# Patient Record
Sex: FEMALE | Race: OTHER | NOT HISPANIC OR LATINO | Employment: UNEMPLOYED | URBAN - METROPOLITAN AREA
[De-identification: names, ages, dates, MRNs, and addresses within clinical notes are randomized per-mention and may not be internally consistent; named-entity substitution may affect disease eponyms.]

---

## 2023-07-28 ENCOUNTER — OFFICE VISIT (OUTPATIENT)
Dept: URGENT CARE | Facility: CLINIC | Age: 38
End: 2023-07-28
Payer: OTHER GOVERNMENT

## 2023-07-28 VITALS
DIASTOLIC BLOOD PRESSURE: 57 MMHG | BODY MASS INDEX: 22.07 KG/M2 | HEIGHT: 69 IN | TEMPERATURE: 98 F | SYSTOLIC BLOOD PRESSURE: 98 MMHG | OXYGEN SATURATION: 99 % | HEART RATE: 86 BPM | RESPIRATION RATE: 20 BRPM | WEIGHT: 149 LBS

## 2023-07-28 DIAGNOSIS — R35.0 FREQUENT URINATION: ICD-10-CM

## 2023-07-28 DIAGNOSIS — N39.0 URINARY TRACT INFECTION WITHOUT HEMATURIA, SITE UNSPECIFIED: Primary | ICD-10-CM

## 2023-07-28 DIAGNOSIS — R73.09 ELEVATED GLUCOSE: ICD-10-CM

## 2023-07-28 LAB
BILIRUB UR QL STRIP: POSITIVE
GLUCOSE SERPL-MCNC: 153 MG/DL (ref 70–110)
GLUCOSE UR QL STRIP: POSITIVE
KETONES UR QL STRIP: POSITIVE
LEUKOCYTE ESTERASE UR QL STRIP: POSITIVE
PH, POC UA: 6.5
POC BLOOD, URINE: NEGATIVE
POC NITRATES, URINE: POSITIVE
PROT UR QL STRIP: POSITIVE
SP GR UR STRIP: 1.01 (ref 1–1.03)
UROBILINOGEN UR STRIP-ACNC: ABNORMAL (ref 0.1–1.1)

## 2023-07-28 PROCEDURE — 81003 POCT URINALYSIS, DIPSTICK, AUTOMATED, W/O SCOPE: ICD-10-PCS | Mod: QW,S$GLB,, | Performed by: PHYSICIAN ASSISTANT

## 2023-07-28 PROCEDURE — 82962 GLUCOSE BLOOD TEST: CPT | Mod: ,,, | Performed by: PHYSICIAN ASSISTANT

## 2023-07-28 PROCEDURE — 82962 POCT GLUCOSE, HAND-HELD DEVICE: ICD-10-PCS | Mod: ,,, | Performed by: PHYSICIAN ASSISTANT

## 2023-07-28 PROCEDURE — 99204 PR OFFICE/OUTPT VISIT, NEW, LEVL IV, 45-59 MIN: ICD-10-PCS | Mod: S$GLB,,, | Performed by: PHYSICIAN ASSISTANT

## 2023-07-28 PROCEDURE — 99204 OFFICE O/P NEW MOD 45 MIN: CPT | Mod: S$GLB,,, | Performed by: PHYSICIAN ASSISTANT

## 2023-07-28 PROCEDURE — 81003 URINALYSIS AUTO W/O SCOPE: CPT | Mod: QW,S$GLB,, | Performed by: PHYSICIAN ASSISTANT

## 2023-07-28 RX ORDER — LEVOTHYROXINE SODIUM 150 MCG
TABLET ORAL
COMMUNITY
Start: 2023-07-13 | End: 2023-09-21 | Stop reason: SDUPTHER

## 2023-07-28 RX ORDER — MONTELUKAST SODIUM 10 MG/1
1 TABLET ORAL DAILY
COMMUNITY
Start: 2022-09-08

## 2023-07-28 RX ORDER — NITROFURANTOIN 25; 75 MG/1; MG/1
100 CAPSULE ORAL 2 TIMES DAILY
Qty: 10 CAPSULE | Refills: 0 | Status: SHIPPED | OUTPATIENT
Start: 2023-07-28 | End: 2023-08-02

## 2023-07-28 NOTE — PROGRESS NOTES
"Subjective:      Patient ID: Halley Mariee is a 37 y.o. female.    Vitals:  height is 5' 9" (1.753 m) and weight is 67.6 kg (149 lb). Her temperature is 97.5 °F (36.4 °C). Her blood pressure is 98/57 (abnormal) and her pulse is 86. Her respiration is 20 and oxygen saturation is 99%.     Chief Complaint: Urinary Frequency    Pt states" c/o burning when urination, frequent urination, bladder pressure that has been going on for 2 days. Took azo."     ROS   Objective:     Physical Exam    Assessment:     1. Frequent urination        Plan:       Frequent urination  -     POCT Urinalysis, Dipstick, Automated, W/O Scope  -     NuSwab Vaginitis Plus (VG+)                    "

## 2023-07-28 NOTE — PATIENT INSTRUCTIONS
Drink plenty of fluids. Take antibiotics as prescribed. Follow up with your OB. Your glucose is mildly elevated. Make sure you discuss this with your OB. Return for any new or worsening symptoms.

## 2023-07-28 NOTE — PROGRESS NOTES
"Subjective:      Patient ID: aHlley Mariee is a 37 y.o. female.    Vitals:  height is 5' 9" (1.753 m) and weight is 67.6 kg (149 lb). Her temperature is 97.5 °F (36.4 °C). Her blood pressure is 98/57 (abnormal) and her pulse is 86. Her respiration is 20 and oxygen saturation is 99%.     Chief Complaint: Urinary Frequency    Patient is a 37 year old female who presents with dysuria, frequency and urgency for two days. She denied PMH. She reports symptoms started two days ago. Currently taking AZO. Denied fever. Denied flank pain. Denied vaginal discharge or vaginal bleeding. Reports no complications with current pregnancy.       Constitution: Negative for chills and fever.   HENT:  Negative for sore throat.    Respiratory:  Negative for cough.    Gastrointestinal:  Negative for abdominal pain, nausea, vomiting and diarrhea.   Genitourinary:  Positive for dysuria, frequency and urgency. Negative for hematuria, vaginal discharge and vaginal bleeding.    Objective:     Physical Exam   Constitutional: She is oriented to person, place, and time. She appears well-developed.   HENT:   Head: Normocephalic and atraumatic.   Ears:   Right Ear: External ear normal.   Left Ear: External ear normal.   Nose: Nose normal.   Mouth/Throat: Mucous membranes are normal.   Eyes: Conjunctivae and lids are normal.   Neck: Trachea normal. Neck supple.   Cardiovascular: Normal rate, regular rhythm and normal heart sounds.   Pulmonary/Chest: Effort normal and breath sounds normal. No respiratory distress.   Abdominal: Normal appearance.      Comments: Gravid abdomen   Musculoskeletal: Normal range of motion.         General: Normal range of motion.   Neurological: She is alert and oriented to person, place, and time. She has normal strength.   Skin: Skin is warm, dry, intact, not diaphoretic and not pale.   Psychiatric: Her speech is normal and behavior is normal. Judgment and thought content normal.   Nursing note and vitals " reviewed.    Assessment:     1. Urinary tract infection without hematuria, site unspecified    2. Frequent urination    3. Elevated glucose        Plan:       Urinary tract infection without hematuria, site unspecified  -     nitrofurantoin, macrocrystal-monohydrate, (MACROBID) 100 MG capsule; Take 1 capsule (100 mg total) by mouth 2 (two) times daily. for 5 days  Dispense: 10 capsule; Refill: 0  -     CULTURE, URINE    Frequent urination  -     POCT Urinalysis, Dipstick, Automated, W/O Scope  -     NuSwab Vaginitis Plus (VG+)  -     POCT Glucose, Hand-Held Device  -     CULTURE, URINE    Elevated glucose          Medical Decision Making:   History:   Old Medical Records: I decided to obtain old medical records.  Clinical Tests:   Lab Tests: Ordered and Reviewed  Urgent Care Management:  Urgent evaluation of a well-appearing 37-year-old female who presents with dysuria, frequency and urgency.  She denies fever or back pain.  She denies vaginal discharge or abnormal vaginal bleeding.  Vital signs are stable.  She is a gravid abdomen.  Urinalysis consistent with urinary tract infection.  Patient has had to have glucose in the urine.  She is currently taking ACS.  Random glucose was 153.  Patient will be placed on antibiotics instructed to follow up closely with OB.  No sign of systemic illness.  No sign of pyelonephritis.

## 2023-08-02 ENCOUNTER — OFFICE VISIT (OUTPATIENT)
Dept: FAMILY MEDICINE | Facility: CLINIC | Age: 38
End: 2023-08-02
Payer: OTHER GOVERNMENT

## 2023-08-02 VITALS
DIASTOLIC BLOOD PRESSURE: 68 MMHG | SYSTOLIC BLOOD PRESSURE: 107 MMHG | WEIGHT: 149.63 LBS | HEIGHT: 69 IN | OXYGEN SATURATION: 99 % | BODY MASS INDEX: 22.16 KG/M2 | HEART RATE: 79 BPM

## 2023-08-02 DIAGNOSIS — E03.9 ACQUIRED HYPOTHYROIDISM: ICD-10-CM

## 2023-08-02 DIAGNOSIS — J45.42 MODERATE PERSISTENT ASTHMA WITH STATUS ASTHMATICUS: ICD-10-CM

## 2023-08-02 DIAGNOSIS — Z34.90 PREGNANCY, UNSPECIFIED GESTATIONAL AGE: Primary | ICD-10-CM

## 2023-08-02 PROCEDURE — 99203 PR OFFICE/OUTPT VISIT, NEW, LEVL III, 30-44 MIN: ICD-10-PCS | Mod: S$PBB,,, | Performed by: NURSE PRACTITIONER

## 2023-08-02 PROCEDURE — 99204 OFFICE O/P NEW MOD 45 MIN: CPT | Performed by: NURSE PRACTITIONER

## 2023-08-02 PROCEDURE — 99203 OFFICE O/P NEW LOW 30 MIN: CPT | Mod: S$PBB,,, | Performed by: NURSE PRACTITIONER

## 2023-08-02 RX ORDER — ALBUTEROL SULFATE 90 UG/1
2 AEROSOL, METERED RESPIRATORY (INHALATION) EVERY 6 HOURS PRN
Qty: 18 G | Refills: 5 | Status: SHIPPED | OUTPATIENT
Start: 2023-08-02

## 2023-08-02 RX ORDER — FLUTICASONE PROPIONATE AND SALMETEROL 100; 50 UG/1; UG/1
1 POWDER RESPIRATORY (INHALATION) 2 TIMES DAILY
COMMUNITY
End: 2023-08-02 | Stop reason: SDUPTHER

## 2023-08-02 RX ORDER — SERTRALINE HYDROCHLORIDE 50 MG/1
1 TABLET, FILM COATED ORAL DAILY
COMMUNITY
Start: 2022-09-30 | End: 2024-01-11 | Stop reason: SDUPTHER

## 2023-08-02 RX ORDER — FLUTICASONE PROPIONATE AND SALMETEROL 100; 50 UG/1; UG/1
1 POWDER RESPIRATORY (INHALATION) 2 TIMES DAILY
Qty: 60 EACH | Refills: 5 | Status: SHIPPED | OUTPATIENT
Start: 2023-08-02

## 2023-08-02 RX ORDER — FOLIC ACID 0.4 MG
400 TABLET ORAL
Status: ON HOLD | COMMUNITY
End: 2023-11-13 | Stop reason: HOSPADM

## 2023-08-02 RX ORDER — ALBUTEROL SULFATE 90 UG/1
2 AEROSOL, METERED RESPIRATORY (INHALATION) EVERY 6 HOURS PRN
COMMUNITY
Start: 2022-09-07 | End: 2023-08-02 | Stop reason: SDUPTHER

## 2023-08-02 NOTE — PROGRESS NOTES
Subjective:       Patient ID: Halley Mariee is a 37 y.o. female.    Chief Complaint: Establish Care and Referral (OB)    Patient presents today as a new patient to me her today to establish care and to obtain referrals for pregnancy.  Patient is about 26 weeks gestation and just moved to the area with her family. She is 37 years of age with a history of asthma, depression, and graves disease.   She takes levothyroxine and states her levels were normal. She has an apointment with Dr. Russell and is needing a referral for OB management. She is .  She also has a history of postpartum depression. She has taken zoloft in the past and does well after delivery with medication. She is not taking it currently.   Patient is a normal weight for height with a BMI of 22.09      Review of Systems   Constitutional:  Negative for activity change, appetite change and fever.   HENT:  Negative for congestion, ear discharge, ear pain, sore throat, trouble swallowing and voice change.    Eyes:  Negative for photophobia, pain, discharge and visual disturbance.   Respiratory:  Negative for cough, chest tightness and shortness of breath.    Cardiovascular:  Negative for chest pain and palpitations.   Gastrointestinal:  Negative for abdominal pain, nausea and vomiting.   Endocrine: Negative for cold intolerance and heat intolerance.        Hypothyroidism   Genitourinary:  Negative for difficulty urinating and dysuria.        Pregnancy   Musculoskeletal:  Negative for arthralgias and gait problem.   Skin:  Negative for rash.   Allergic/Immunologic: Negative for immunocompromised state.   Neurological:  Negative for speech difficulty and headaches.   Psychiatric/Behavioral:  Positive for dysphoric mood (history). Negative for confusion, self-injury and suicidal ideas.        Past Medical History:   Diagnosis Date    Asthma     Depression affecting pregnancy, postpartum     Graves disease       Past Surgical History:   Procedure  Laterality Date    LAPAROSCOPIC APPENDECTOMY Right        Family History   Problem Relation Age of Onset    Hypertension Mother     Diabetes Mother     Hypothyroidism Father     Vitiligo Father     Hashimoto's thyroiditis Sister     Asthma Sister     Epilepsy Brother     Asthma Daughter         1 daugther    Hypertension Maternal Grandmother     Alzheimer's disease Maternal Grandmother     Hyperlipidemia Maternal Grandmother     Hypertension Maternal Grandfather         passed away lung cancer    Cirrhosis Paternal Grandmother        Social History     Socioeconomic History    Marital status:     Number of children: 4   Tobacco Use    Smoking status: Never    Smokeless tobacco: Never   Substance and Sexual Activity    Alcohol use: Not Currently    Drug use: Not Currently    Sexual activity: Yes     Partners: Male       Current Outpatient Medications   Medication Sig Dispense Refill    cetirizine 10 mg Cap Take by mouth.      folic acid (FOLVITE) 400 MCG tablet Take 400 mcg by mouth.      montelukast (SINGULAIR) 10 mg tablet Take 1 tablet by mouth once daily.      multivitamin-Ca-iron-minerals 18-0.4 mg Tab Take by mouth.      multivitamin-iron-folic acid Tab Take by mouth.      SYNTHROID 150 mcg tablet Take by mouth.      albuterol (PROVENTIL/VENTOLIN HFA) 90 mcg/actuation inhaler Inhale 2 puffs into the lungs every 6 (six) hours as needed for Wheezing or Shortness of Breath. 18 g 5    CALCIUM-MAGNESIUM-VITAMIN D2 ORAL Take by mouth.      fluticasone-salmeterol diskus inhaler 100-50 mcg Inhale 1 puff into the lungs 2 (two) times daily. Controller 60 each 5    sertraline (ZOLOFT) 50 MG tablet Take 1 tablet by mouth once daily.       No current facility-administered medications for this visit.       Review of patient's allergies indicates:   Allergen Reactions    Bee pollen      Wheezing   Trigger asthma    Chaste tree extract      Wheezing   Trigger asthma    Grass pollen-red top, standard      Wheezing  "  Trigger asthma    Mite extract      Wheezing   Trigger asthma    Mold      Wheezing   Trigger asthma     Objective:    HPI     Referral     Additional comments: OB          Last edited by Marli Marcelino LPN on 8/2/2023  1:42 PM.      Blood pressure 107/68, pulse 79, height 5' 9" (1.753 m), weight 67.9 kg (149 lb 9.6 oz), SpO2 99 %. Body mass index is 22.09 kg/m².   Physical Exam  Vitals and nursing note reviewed.   Constitutional:       General: She is not in acute distress.     Appearance: Normal appearance. She is well-developed.   HENT:      Head: Normocephalic and atraumatic.      Right Ear: External ear normal.      Left Ear: External ear normal.      Nose: Nose normal.      Mouth/Throat:      Mouth: Mucous membranes are moist.      Pharynx: Oropharynx is clear. No oropharyngeal exudate.   Eyes:      General: Lids are normal. Lids are everted, no foreign bodies appreciated.      Conjunctiva/sclera: Conjunctivae normal.      Pupils: Pupils are equal, round, and reactive to light.      Right eye: Pupil is round and reactive.      Left eye: Pupil is round and reactive.   Neck:      Trachea: Trachea normal.   Cardiovascular:      Rate and Rhythm: Normal rate and regular rhythm.      Pulses: Normal pulses.      Heart sounds: Normal heart sounds, S1 normal and S2 normal.   Pulmonary:      Effort: Pulmonary effort is normal.      Breath sounds: Normal breath sounds.   Abdominal:      General: Abdomen is flat. Bowel sounds are normal. There is distension (pregnancy).      Palpations: Abdomen is soft. Abdomen is not rigid.      Tenderness: There is no guarding.   Musculoskeletal:         General: Normal range of motion.      Cervical back: Normal range of motion and neck supple. No muscular tenderness.   Lymphadenopathy:      Cervical: No cervical adenopathy.   Skin:     General: Skin is warm and dry.      Capillary Refill: Capillary refill takes less than 2 seconds.   Neurological:      General: No focal deficit " present.      Mental Status: She is alert and oriented to person, place, and time.   Psychiatric:         Mood and Affect: Mood and affect normal. Mood is not anxious or depressed.         Behavior: Behavior normal. Behavior is cooperative.         Thought Content: Thought content normal.         Judgment: Judgment normal.             Assessment:       1. Pregnancy, unspecified gestational age    2. Moderate persistent asthma with status asthmaticus    3. Acquired hypothyroidism    4. BMI 22.0-22.9, adult        Plan:       Halley was seen today for establish care and referral.    Diagnoses and all orders for this visit:    Pregnancy, unspecified gestational age  -     Ambulatory referral/consult to Obstetrics / Gynecology; Future    Moderate persistent asthma with status asthmaticus  -     fluticasone-salmeterol diskus inhaler 100-50 mcg; Inhale 1 puff into the lungs 2 (two) times daily. Controller  -     albuterol (PROVENTIL/VENTOLIN HFA) 90 mcg/actuation inhaler; Inhale 2 puffs into the lungs every 6 (six) hours as needed for Wheezing or Shortness of Breath.    Acquired hypothyroidism  Continue current dose.  Levels can be checked and managed by OB or can be managed by me. Patient will notify me if she is need of a refill but denies need at this time.    BMI 22.0-22.9, adult  Healthy diet and exercise encouraged.

## 2023-08-04 LAB
A VAGINAE DNA VAG QL NAA+PROBE: NORMAL SCORE
BVAB2 DNA VAG QL NAA+PROBE: NORMAL SCORE
C ALBICANS DNA VAG QL NAA+PROBE: NEGATIVE
C GLABRATA DNA VAG QL NAA+PROBE: NEGATIVE
C TRACH DNA VAG QL NAA+PROBE: NEGATIVE
MEGA1 DNA VAG QL NAA+PROBE: NORMAL SCORE
N GONORRHOEA DNA VAG QL NAA+PROBE: NEGATIVE
T VAGINALIS DNA VAG QL NAA+PROBE: NEGATIVE

## 2023-08-05 LAB
BACTERIA UR CULT: ABNORMAL
BACTERIA UR CULT: ABNORMAL
OTHER ANTIBIOTIC SUSC ISLT: ABNORMAL

## 2023-08-15 LAB
ABO + RH BLD: NORMAL
HIV 1+2 AB+HIV1 P24 AG SERPL QL IA: NEGATIVE
RUBELLA IMMUNE STATUS: NORMAL
STREP B PCR (OHS): NOT DETECTED
T PALLIDUM AB SER QL: NONREACTIVE

## 2023-09-19 ENCOUNTER — PATIENT MESSAGE (OUTPATIENT)
Dept: MATERNAL FETAL MEDICINE | Facility: CLINIC | Age: 38
End: 2023-09-19
Payer: OTHER GOVERNMENT

## 2023-09-19 ENCOUNTER — TELEPHONE (OUTPATIENT)
Dept: MATERNAL FETAL MEDICINE | Facility: CLINIC | Age: 38
End: 2023-09-19
Payer: OTHER GOVERNMENT

## 2023-09-19 DIAGNOSIS — Z36.89 ENCOUNTER FOR FETAL ANATOMIC SURVEY: Primary | ICD-10-CM

## 2023-09-19 NOTE — TELEPHONE ENCOUNTER
Left patient a message to return my call at [phone].    ----- Message from Eugenie Elder sent at 9/19/2023 12:58 PM CDT -----  Regarding: Referral  Dr. TERI Ojeda sent the [facility] team a referral for the following patient to be seen in the Maternal Fetal Medicine Clinic. I have updated the patients demographics and scanned their records into .     Can you please connect with the patient in order to schedule their MFM appointment please?     Thank you,   [facility]

## 2023-09-21 ENCOUNTER — PATIENT MESSAGE (OUTPATIENT)
Dept: FAMILY MEDICINE | Facility: CLINIC | Age: 38
End: 2023-09-21

## 2023-09-21 DIAGNOSIS — E03.9 ACQUIRED HYPOTHYROIDISM: Primary | ICD-10-CM

## 2023-09-21 RX ORDER — LEVOTHYROXINE SODIUM 150 MCG
TABLET ORAL
Qty: 90 TABLET | Refills: 1 | Status: SHIPPED | OUTPATIENT
Start: 2023-09-21 | End: 2024-01-11 | Stop reason: SDUPTHER

## 2023-09-22 ENCOUNTER — TELEPHONE (OUTPATIENT)
Dept: MATERNAL FETAL MEDICINE | Facility: CLINIC | Age: 38
End: 2023-09-22
Payer: OTHER GOVERNMENT

## 2023-09-22 NOTE — TELEPHONE ENCOUNTER
Spoke with patient today and scheduled her for a Maternal Fetal Medicine consult and ultrasound on 10/03 at 1240 PM.  Patient verbalized her understanding.      ----- Message from Tahmina Fritz MA sent at 9/22/2023  9:14 AM CDT -----  Pt is returning your call to schedule consult.      Pt contact: [phone].

## 2023-10-03 ENCOUNTER — PROCEDURE VISIT (OUTPATIENT)
Dept: MATERNAL FETAL MEDICINE | Facility: CLINIC | Age: 38
End: 2023-10-03
Payer: OTHER GOVERNMENT

## 2023-10-03 ENCOUNTER — OFFICE VISIT (OUTPATIENT)
Dept: MATERNAL FETAL MEDICINE | Facility: CLINIC | Age: 38
End: 2023-10-03
Payer: OTHER GOVERNMENT

## 2023-10-03 VITALS — WEIGHT: 162.25 LBS | BODY MASS INDEX: 23.96 KG/M2

## 2023-10-03 DIAGNOSIS — J45.909 ASTHMA AFFECTING PREGNANCY IN THIRD TRIMESTER: ICD-10-CM

## 2023-10-03 DIAGNOSIS — Z36.89 ENCOUNTER FOR FETAL ANATOMIC SURVEY: ICD-10-CM

## 2023-10-03 DIAGNOSIS — O99.513 ASTHMA AFFECTING PREGNANCY IN THIRD TRIMESTER: ICD-10-CM

## 2023-10-03 DIAGNOSIS — O09.523 MULTIGRAVIDA OF ADVANCED MATERNAL AGE IN THIRD TRIMESTER: ICD-10-CM

## 2023-10-03 DIAGNOSIS — E05.00 GRAVES DISEASE: ICD-10-CM

## 2023-10-03 PROBLEM — O99.013 ANEMIA AFFECTING PREGNANCY IN THIRD TRIMESTER: Status: ACTIVE | Noted: 2023-10-03

## 2023-10-03 PROBLEM — O99.013 ANEMIA AFFECTING PREGNANCY IN THIRD TRIMESTER: Status: RESOLVED | Noted: 2023-10-03 | Resolved: 2023-10-03

## 2023-10-03 PROCEDURE — 76816 OB US FOLLOW-UP PER FETUS: CPT | Mod: PBBFAC | Performed by: OBSTETRICS & GYNECOLOGY

## 2023-10-03 PROCEDURE — 99214 OFFICE O/P EST MOD 30 MIN: CPT | Mod: S$PBB,,, | Performed by: OBSTETRICS & GYNECOLOGY

## 2023-10-03 PROCEDURE — 99214 PR OFFICE/OUTPT VISIT, EST, LEVL IV, 30-39 MIN: ICD-10-PCS | Mod: S$PBB,,, | Performed by: OBSTETRICS & GYNECOLOGY

## 2023-10-03 PROCEDURE — 99999 PR PBB SHADOW E&M-EST. PATIENT-LVL III: CPT | Mod: PBBFAC,,, | Performed by: OBSTETRICS & GYNECOLOGY

## 2023-10-03 PROCEDURE — 99999 PR PBB SHADOW E&M-EST. PATIENT-LVL III: ICD-10-PCS | Mod: PBBFAC,,, | Performed by: OBSTETRICS & GYNECOLOGY

## 2023-10-03 PROCEDURE — 76816 US MFM PROCEDURE (VIEWPOINT): ICD-10-PCS | Mod: 26,S$PBB,, | Performed by: OBSTETRICS & GYNECOLOGY

## 2023-10-03 PROCEDURE — 99213 OFFICE O/P EST LOW 20 MIN: CPT | Mod: PBBFAC | Performed by: OBSTETRICS & GYNECOLOGY

## 2023-10-03 NOTE — ASSESSMENT & PLAN NOTE
- S/p BATISTA at 10yo for Graves Disease    Recommendations:  Medication management:   Continue Synthroid at the current dose of 150 micrograms/day with 300mcg on Sundays   Up to 1/3 of women may require increased hormone replacement in the first trimester, thus consider an empiric 25% increase in medication dose at pregnancy confirmation, while discharging postpartum patients on their pre-pregnancy dose.    Laboratory evaluation:   TSH should be monitored at least every trimester, or every 4 weeks after any medication adjustment. Adjust treatment as necessary to maintain TSH goal < 2.5 mIU/L.

## 2023-10-03 NOTE — ASSESSMENT & PLAN NOTE
- taking ASA 81  - cfDNA negative  - AFP negative  - Second trimester anatomy scan at OSH with appropriate growth and no abnormalities noted  - Repeat US performed today with no interval changes. Appropriate growth, no abnormalities.   - Counseled patient regarding the above normal findings and discussed that we will follow up with patient as needed.

## 2023-10-03 NOTE — PROGRESS NOTES
Hematoma  A hematoma is caused by an injury with damage to small blood vessels. This causes blood to leak into the tissues. Blood forms a pocket under the skin that swells and looks like a purplish patch.  Gradually the blood in the hematoma is absorbed back into the body. The swelling and pain of the hematoma will go away. This takes from one to four weeks, depending on the size of the hematoma. The skin over the hematoma may turn bluish then brown and yellow as the blood is dissolved and absorbed.  Home Care:  · Limit motion of the joints near the hematoma. If the hematoma is large and painful, you should avoid sports and other vigorous physical activity until the swelling and pain goes away.  · Apply an ice pack (ice cubes in a plastic bag, wrapped in a towel) over the injured area for 20 minutes every 1-2 hours the first day. You should continue with ice packs 3-4 times a day for the next two days. Continue the use of ice packs for relief of pain and swelling as needed.  · You may use acetaminophen (Tylenol) or ibuprofen (Motrin, Advil) to control pain, unless another pain medicine was prescribed. [ NOTE : If you have chronic liver or kidney disease or ever had a stomach ulcer or GI bleeding, talk with your doctor before using these medicines.]  Follow Up  with your doctor or as advised by our staff.  [ NOTE: A radiologist will review any X-rays that were taken. We will notify you of any new findings that may affect your care.]  Get Prompt Medical Attention  if any of the following occur:  · Redness around the hematoma  · Increase in pain or warmth in the hematoma  · Increase in size of the hematoma  · Fever of 100.4ºF (38ºC) or higher, or as directed by your healthcare provider  · If the hematoma is on the arm or leg, watch for:  ¨ Increased swelling or pain in the extremity  ¨ Numbness or tingling or blue color of the hand or foot  © 9227-8359 [employer]. [address] [place] [address], [facility]  MATERNAL-FETAL MEDICINE   CONSULT NOTE    Provider requesting consultation: Bienvenido Russell MD     SUBJECTIVE:     Ms. Hallye Mariee is a 37 y.o.  female with IUP at 35w0d who is seen in consultation by MFM for evaluation and management of:  Problem   Multigravida of Advanced Maternal Age in Third Trimester   Graves Disease   Asthma Affecting Pregnancy in Third Trimester            Medication List with Changes/Refills   Current Medications    ALBUTEROL (PROVENTIL/VENTOLIN HFA) 90 MCG/ACTUATION INHALER    Inhale 2 puffs into the lungs every 6 (six) hours as needed for Wheezing or Shortness of Breath.    CALCIUM-MAGNESIUM-VITAMIN D2 ORAL    Take by mouth.    CETIRIZINE 10 MG CAP    Take by mouth.    FLUTICASONE-SALMETEROL DISKUS INHALER 100-50 MCG    Inhale 1 puff into the lungs 2 (two) times daily. Controller    FOLIC ACID (FOLVITE) 400 MCG TABLET    Take 400 mcg by mouth.    MONTELUKAST (SINGULAIR) 10 MG TABLET    Take 1 tablet by mouth once daily.    MULTIVITAMIN-CA-IRON-MINERALS 18-0.4 MG TAB    Take by mouth.    MULTIVITAMIN-IRON-FOLIC ACID TAB    Take by mouth.    PRENATAL VIT NO.124/IRON/FOLIC (PRENATAL VITAMIN ORAL)    Take by mouth Daily.    SERTRALINE (ZOLOFT) 50 MG TABLET    Take 1 tablet by mouth once daily.    SYNTHROID 150 MCG TABLET    Brand Only. Take 1 tablets 150 mcg by mouth daily.       Review of patient's allergies indicates:   Allergen Reactions    Bee pollen      Wheezing   Trigger asthma    Chaste tree extract      Wheezing   Trigger asthma    Grass pollen-red top, standard      Wheezing   Trigger asthma    Mite extract      Wheezing   Trigger asthma    Mold      Wheezing   Trigger asthma       PMH:  Past Medical History:   Diagnosis Date    Asthma     Depression affecting pregnancy, postpartum     Graves disease        PObHx:  OB History    Para Term  AB Living   6 3 3   2 3   SAB IAB Ectopic Multiple Live Births   2       3      # Outcome Date [place] Lbr Tarun/2nd Weight Sex  94373. All rights reserved. This information is not intended as a substitute for professional medical care. Always follow your healthcare professional's instructions.        Head Injury (Child)       Your child has a head injury. It does not appear serious at this time. But symptoms of a more serious problem, such as mild brain injury (concussion), or bruising or bleeding in the brain, may appear later. For this reason, you will need to watch your child for any of the symptoms listed below. Once at home, also be sure to follow any care instructions you’re given for your child.  Home care  Watch for the following symptoms  For the next 24 hours (or longer, if directed), you or another adult must stay with your child. Seek emergency medical care if your child has any of these symptoms over the next hours to days:   · Headache  · Nausea or vomiting  · Dizziness  · Sensitivity to light or noise  · Unusual sleepiness or grogginess  · Trouble falling asleep  · Personality changes  · Vision changes  · Memory loss  · Confusion  · Trouble walking or clumsiness  · Loss of consciousness (even for a short time)  · Inability to be awakened  · Stiff neck  · Weakness or numbness in any part of the body  · Seizures  For young children, also watch for crying that can’t be soothed, refusal to feed, or any signs of changes to the head such as bruising, bulging, or a soft or pushed-in spot.  General care  · If your child was prescribed medicines for pain, be sure to given them to your child as directed. Note: Don’t give your child other pain medicines without checking with the provider first.  · To help reduce swelling and pain, apply a cold source to the injured area for up to 20 minutes at a time. Do this as often as directed. Use a cold pack or bag of ice wrapped in a thin towel. Never apply a cold source directly to the skin.  · If your child has cuts or scrapes on the face or scalp, care for them as directed.  · For the next 24  Delivery Anes PTL Lv   6 Current            5 SAB 12/2022     SAB      4 Term 06/16/20 40w0d  3.459 kg (7 lb 10 oz) F Vag-Spont  N YAZAN   3 SAB 01/2019     SAB      2 Term 10/22/14 40w1d  3.289 kg (7 lb 4 oz) F Vag-Spont  N YAZAN   1 Term 07/18/06 40w1d  3.317 kg (7 lb 5 oz) F Vag-Spont  N YAZAN       PSH:  Past Surgical History:   Procedure Laterality Date    LAPAROSCOPIC APPENDECTOMY Right        Family history:family history includes Alzheimer's disease in her maternal grandmother; Asthma in her daughter and sister; Cirrhosis in her paternal grandmother; Diabetes in her mother; Epilepsy in her brother; Hashimoto's thyroiditis in her sister; Hyperlipidemia in her maternal grandmother; Hypertension in her maternal grandfather, maternal grandmother, and mother; Hypothyroidism in her father; Vitiligo in her father.    Social history: reports that she has never smoked. She has never used smokeless tobacco. She reports that she does not currently use alcohol. She reports that she does not currently use drugs.    Genetic history:  The patient denies any inherited genetic diseases or birth defects in herself or her partner's personal history or family.    Objective:   Wt 73.6 kg (162 lb 4.1 oz)   LMP  (LMP Unknown)   BMI 23.96 kg/m²     Physical Exam  Vitals reviewed.   Constitutional:       Appearance: Normal appearance.   HENT:      Head: Normocephalic and atraumatic.   Eyes:      Extraocular Movements: Extraocular movements intact.      Conjunctiva/sclera: Conjunctivae normal.   Cardiovascular:      Rate and Rhythm: Normal rate.   Pulmonary:      Effort: Pulmonary effort is normal. No respiratory distress.   Abdominal:      Comments: Gravid uterus   Neurological:      General: No focal deficit present.      Mental Status: She is alert and oriented to person, place, and time.   Psychiatric:         Mood and Affect: Mood normal.         Behavior: Behavior normal.         Ultrasound performed. See viewpoint for full  hours (or longer, if advised), your child will need to:  ¨ Avoid lifting and other strenuous activities.  ¨ Avoid playing sports or any other activities that could result in another head injury.  ¨ Limit TV, smartphones, video games, computers, and music or avoid them completely. These activities may make symptoms worse.  Follow-up care  Follow up with your child’s healthcare provider, or as directed. If imaging tests were done, they will be reviewed by a doctor. You will be told the results and any new findings that may affect your child’s care.  When to seek medical advice  Unless told otherwise, call the provider right away if:  · Your child is 3 months old or younger and has a fever of 100.4°F (38°C) or higher. (Get medical care right away. Fever in a young baby can be a sign of a dangerous infection.)  · Your child is younger than 2 years of age and has a fever of 100.4°F (38°C) that lasts for more than 1 day.  · Your child is 2 years old or older and has a fever of 100.4°F (38°C) that lasts for more than 3 days.  · Your child is of any age and has repeated fevers above 104°F (40°C).  Also call the provider right away if your child has any of the following:  · Pain that doesn’t get better or worsens  · New or increased swelling or bruising  · Increased redness, warmth, drainage, or bleeding from the injured area  · Fluid drainage or bleeding from the nose or ears  · Sick appearance or behaviors that worry you  © 0318-3851 [employer]. [address] [place] [address], [place] [address]. All rights reserved. This information is not intended as a substitute for professional medical care. Always follow your healthcare professional's instructions.         ultrasound report.      Significant labs/imaging:  N/A    ASSESSMENT/PLAN:     37 y.o.  female with IUP at 35w0d     Multigravida of advanced maternal age in third trimester  - taking ASA 81  - cfDNA negative  - AFP negative  - Second trimester anatomy scan at OSH with appropriate growth and no abnormalities noted  - Repeat US performed today with no interval changes. Appropriate growth, no abnormalities.   - Counseled patient regarding the above normal findings and discussed that we will follow up with patient as needed.     Graves disease  - S/p BATISTA at 10yo for Graves Disease    Recommendations:  Medication management:  Continue Synthroid at the current dose of 150 micrograms/day with 300mcg on Sundays  Up to 1/3 of women may require increased hormone replacement in the first trimester, thus consider an empiric 25% increase in medication dose at pregnancy confirmation, while discharging postpartum patients on their pre-pregnancy dose.    Laboratory evaluation:   TSH should be monitored at least every trimester, or every 4 weeks after any medication adjustment. Adjust treatment as necessary to maintain TSH goal < 2.5 mIU/L.    Asthma affecting pregnancy in third trimester  - Asymptomatic  - Using singulair and advair  - Routine care per primary      FOLLOW UP:   No further ultrasounds or visits were scheduled  F/u with primary OB for routine prenatal care      30 minutes of total time spent on the encounter, which includes face to face time and non-face to face time preparing to see the patient (eg, review of tests), obtaining and/or reviewing separately obtained history, documenting clinical information in the electronic or other health record, independently interpreting results (not separately reported) and communicating results to the patient/family/caregiver, or care coordination (not separately reported).    Andie Richards MD  OB/GYN PGY-2     Electronically Signed by Andie Richards October 3, 2023

## 2023-10-11 DIAGNOSIS — O09.523 ADVANCED MATERNAL AGE IN MULTIGRAVIDA, THIRD TRIMESTER: Primary | ICD-10-CM

## 2023-10-13 ENCOUNTER — HOSPITAL ENCOUNTER (OUTPATIENT)
Facility: HOSPITAL | Age: 38
Discharge: HOME | End: 2023-10-13
Attending: SPECIALIST | Admitting: SPECIALIST
Payer: OTHER GOVERNMENT

## 2023-10-13 VITALS — SYSTOLIC BLOOD PRESSURE: 106 MMHG | DIASTOLIC BLOOD PRESSURE: 58 MMHG | HEART RATE: 81 BPM

## 2023-10-13 DIAGNOSIS — O36.8190 DECREASED FETAL MOVEMENT: ICD-10-CM

## 2023-10-13 PROCEDURE — 99211 OFF/OP EST MAY X REQ PHY/QHP: CPT | Mod: 25

## 2023-10-13 PROCEDURE — 59025 FETAL NON-STRESS TEST: CPT | Mod: 59

## 2023-10-13 RX ORDER — PROCHLORPERAZINE EDISYLATE 5 MG/ML
5 INJECTION INTRAMUSCULAR; INTRAVENOUS EVERY 6 HOURS PRN
Status: DISCONTINUED | OUTPATIENT
Start: 2023-10-13 | End: 2023-10-13 | Stop reason: HOSPADM

## 2023-10-13 RX ORDER — SODIUM CHLORIDE, SODIUM LACTATE, POTASSIUM CHLORIDE, CALCIUM CHLORIDE 600; 310; 30; 20 MG/100ML; MG/100ML; MG/100ML; MG/100ML
INJECTION, SOLUTION INTRAVENOUS CONTINUOUS
Status: DISCONTINUED | OUTPATIENT
Start: 2023-10-13 | End: 2023-10-13 | Stop reason: HOSPADM

## 2023-10-13 NOTE — PROGRESS NOTES
10/13/23 1156   Non Stress Test - General   $ NST Procedure Charge Completed   Indications/Pt Reported Complaint decreased FM   Number of Fetuses 1   Acoustic Stimulator No   Contractions Not present   Nonstress Test Baby A   Variability Moderate   Decels None   Accels Present   Baseline    BRODY (If Indicated) (cm) 14.5 cm   Interpretation Baby A   Nonstress Test Interpretation Reactive   Overall Impression Reassuring   Comments Strip Reviewed. Reactive and reassuring. BPP 8/8 Vertex. Dr. Russell notified. OK to discharge home.

## 2023-10-17 ENCOUNTER — HOSPITAL ENCOUNTER (OUTPATIENT)
Facility: HOSPITAL | Age: 38
Discharge: HOME | End: 2023-10-17
Attending: SPECIALIST | Admitting: SPECIALIST
Payer: OTHER GOVERNMENT

## 2023-10-17 VITALS — RESPIRATION RATE: 17 BRPM | HEART RATE: 85 BPM | SYSTOLIC BLOOD PRESSURE: 101 MMHG | DIASTOLIC BLOOD PRESSURE: 59 MMHG

## 2023-10-17 DIAGNOSIS — O09.523 ADVANCED MATERNAL AGE IN MULTIGRAVIDA, THIRD TRIMESTER: ICD-10-CM

## 2023-10-17 PROCEDURE — 59025 FETAL NON-STRESS TEST: CPT

## 2023-10-20 ENCOUNTER — HOSPITAL ENCOUNTER (OUTPATIENT)
Facility: HOSPITAL | Age: 38
Discharge: HOME | End: 2023-10-20
Attending: SPECIALIST | Admitting: SPECIALIST
Payer: OTHER GOVERNMENT

## 2023-10-20 VITALS — SYSTOLIC BLOOD PRESSURE: 101 MMHG | DIASTOLIC BLOOD PRESSURE: 56 MMHG | HEART RATE: 85 BPM | RESPIRATION RATE: 16 BRPM

## 2023-10-20 DIAGNOSIS — O09.523 ADVANCED MATERNAL AGE IN MULTIGRAVIDA, THIRD TRIMESTER: ICD-10-CM

## 2023-10-20 PROCEDURE — 59025 FETAL NON-STRESS TEST: CPT

## 2023-10-27 ENCOUNTER — PATIENT MESSAGE (OUTPATIENT)
Dept: FAMILY MEDICINE | Facility: CLINIC | Age: 38
End: 2023-10-27

## 2023-10-31 ENCOUNTER — HOSPITAL ENCOUNTER (OUTPATIENT)
Facility: HOSPITAL | Age: 38
Discharge: HOME | End: 2023-10-31
Attending: SPECIALIST | Admitting: SPECIALIST
Payer: OTHER GOVERNMENT

## 2023-10-31 VITALS — DIASTOLIC BLOOD PRESSURE: 57 MMHG | HEART RATE: 80 BPM | SYSTOLIC BLOOD PRESSURE: 98 MMHG

## 2023-10-31 DIAGNOSIS — O09.523 ADVANCED MATERNAL AGE IN MULTIGRAVIDA, THIRD TRIMESTER: ICD-10-CM

## 2023-10-31 PROCEDURE — 59025 FETAL NON-STRESS TEST: CPT

## 2023-11-07 DIAGNOSIS — Z34.90 ENCOUNTER FOR ELECTIVE INDUCTION OF LABOR: Primary | ICD-10-CM

## 2023-11-11 ENCOUNTER — HOSPITAL ENCOUNTER (INPATIENT)
Facility: HOSPITAL | Age: 38
LOS: 2 days | Discharge: HOME | End: 2023-11-13
Attending: SPECIALIST | Admitting: STUDENT IN AN ORGANIZED HEALTH CARE EDUCATION/TRAINING PROGRAM
Payer: OTHER GOVERNMENT

## 2023-11-11 DIAGNOSIS — O09.523 MULTIGRAVIDA OF ADVANCED MATERNAL AGE IN THIRD TRIMESTER: ICD-10-CM

## 2023-11-11 DIAGNOSIS — Z34.90 PREGNANCY: ICD-10-CM

## 2023-11-11 DIAGNOSIS — N85.8 ALTERATION IN COMFORT ASSOCIATED WITH UTERINE CONTRACTIONS: ICD-10-CM

## 2023-11-11 PROCEDURE — 12000002 HC ACUTE/MED SURGE SEMI-PRIVATE ROOM

## 2023-11-11 PROCEDURE — 86592 SYPHILIS TEST NON-TREP QUAL: CPT | Performed by: STUDENT IN AN ORGANIZED HEALTH CARE EDUCATION/TRAINING PROGRAM

## 2023-11-11 PROCEDURE — 86870 RBC ANTIBODY IDENTIFICATION: CPT | Performed by: STUDENT IN AN ORGANIZED HEALTH CARE EDUCATION/TRAINING PROGRAM

## 2023-11-11 PROCEDURE — 63600175 PHARM REV CODE 636 W HCPCS: Performed by: STUDENT IN AN ORGANIZED HEALTH CARE EDUCATION/TRAINING PROGRAM

## 2023-11-11 PROCEDURE — 99211 OFF/OP EST MAY X REQ PHY/QHP: CPT

## 2023-11-11 PROCEDURE — 63600175 PHARM REV CODE 636 W HCPCS: Performed by: ANESTHESIOLOGY

## 2023-11-11 PROCEDURE — 85025 COMPLETE CBC W/AUTO DIFF WBC: CPT | Performed by: STUDENT IN AN ORGANIZED HEALTH CARE EDUCATION/TRAINING PROGRAM

## 2023-11-11 PROCEDURE — 81003 URINALYSIS AUTO W/O SCOPE: CPT | Mod: 59 | Performed by: STUDENT IN AN ORGANIZED HEALTH CARE EDUCATION/TRAINING PROGRAM

## 2023-11-11 PROCEDURE — 80307 DRUG TEST PRSMV CHEM ANLYZR: CPT | Performed by: STUDENT IN AN ORGANIZED HEALTH CARE EDUCATION/TRAINING PROGRAM

## 2023-11-11 PROCEDURE — 80348 DRUG SCREENING BUPRENORPHINE: CPT | Performed by: STUDENT IN AN ORGANIZED HEALTH CARE EDUCATION/TRAINING PROGRAM

## 2023-11-11 PROCEDURE — 85461 HEMOGLOBIN FETAL: CPT | Performed by: SPECIALIST

## 2023-11-11 PROCEDURE — 86900 BLOOD TYPING SEROLOGIC ABO: CPT | Performed by: STUDENT IN AN ORGANIZED HEALTH CARE EDUCATION/TRAINING PROGRAM

## 2023-11-11 RX ORDER — ONDANSETRON 4 MG/1
8 TABLET, ORALLY DISINTEGRATING ORAL EVERY 8 HOURS PRN
Status: DISCONTINUED | OUTPATIENT
Start: 2023-11-12 | End: 2023-11-12

## 2023-11-11 RX ORDER — OXYTOCIN 10 [USP'U]/ML
10 INJECTION, SOLUTION INTRAMUSCULAR; INTRAVENOUS ONCE AS NEEDED
Status: DISCONTINUED | OUTPATIENT
Start: 2023-11-12 | End: 2023-11-12

## 2023-11-11 RX ORDER — OXYTOCIN/RINGER'S LACTATE 30/500 ML
334 PLASTIC BAG, INJECTION (ML) INTRAVENOUS ONCE AS NEEDED
Status: DISCONTINUED | OUTPATIENT
Start: 2023-11-11 | End: 2023-11-12

## 2023-11-11 RX ORDER — TRANEXAMIC ACID 10 MG/ML
1000 INJECTION, SOLUTION INTRAVENOUS EVERY 30 MIN PRN
Status: DISCONTINUED | OUTPATIENT
Start: 2023-11-11 | End: 2023-11-12

## 2023-11-11 RX ORDER — FENTANYL/BUPIVACAINE/NS/PF 2MCG/ML-.1
PLASTIC BAG, INJECTION (ML) INJECTION CONTINUOUS
Status: DISCONTINUED | OUTPATIENT
Start: 2023-11-11 | End: 2023-11-12

## 2023-11-11 RX ORDER — OXYTOCIN/RINGER'S LACTATE 30/500 ML
95 PLASTIC BAG, INJECTION (ML) INTRAVENOUS ONCE
Status: DISCONTINUED | OUTPATIENT
Start: 2023-11-12 | End: 2023-11-12

## 2023-11-11 RX ORDER — SIMETHICONE 80 MG
1 TABLET,CHEWABLE ORAL 4 TIMES DAILY PRN
Status: DISCONTINUED | OUTPATIENT
Start: 2023-11-12 | End: 2023-11-12

## 2023-11-11 RX ORDER — OXYTOCIN/RINGER'S LACTATE 30/500 ML
0-30 PLASTIC BAG, INJECTION (ML) INTRAVENOUS CONTINUOUS
Status: DISCONTINUED | OUTPATIENT
Start: 2023-11-12 | End: 2023-11-12

## 2023-11-11 RX ORDER — MISOPROSTOL 200 UG/1
800 TABLET ORAL ONCE AS NEEDED
Status: DISCONTINUED | OUTPATIENT
Start: 2023-11-11 | End: 2023-11-12

## 2023-11-11 RX ORDER — PROCHLORPERAZINE EDISYLATE 5 MG/ML
5 INJECTION INTRAMUSCULAR; INTRAVENOUS EVERY 6 HOURS PRN
Status: DISCONTINUED | OUTPATIENT
Start: 2023-11-12 | End: 2023-11-12

## 2023-11-11 RX ORDER — CARBOPROST TROMETHAMINE 250 UG/ML
250 INJECTION, SOLUTION INTRAMUSCULAR
Status: DISCONTINUED | OUTPATIENT
Start: 2023-11-12 | End: 2023-11-12

## 2023-11-11 RX ORDER — MISOPROSTOL 200 UG/1
800 TABLET ORAL ONCE AS NEEDED
Status: DISCONTINUED | OUTPATIENT
Start: 2023-11-12 | End: 2023-11-12

## 2023-11-11 RX ORDER — METHYLERGONOVINE MALEATE 0.2 MG/ML
200 INJECTION INTRAVENOUS ONCE AS NEEDED
Status: DISCONTINUED | OUTPATIENT
Start: 2023-11-12 | End: 2023-11-12

## 2023-11-11 RX ORDER — ROPIVACAINE HYDROCHLORIDE 2 MG/ML
20 INJECTION, SOLUTION EPIDURAL; INFILTRATION ONCE AS NEEDED
Status: COMPLETED | OUTPATIENT
Start: 2023-11-11 | End: 2023-11-11

## 2023-11-11 RX ORDER — DIPHENOXYLATE HYDROCHLORIDE AND ATROPINE SULFATE 2.5; .025 MG/1; MG/1
2 TABLET ORAL EVERY 6 HOURS PRN
Status: DISCONTINUED | OUTPATIENT
Start: 2023-11-12 | End: 2023-11-12

## 2023-11-11 RX ORDER — OXYTOCIN/RINGER'S LACTATE 30/500 ML
95 PLASTIC BAG, INJECTION (ML) INTRAVENOUS ONCE AS NEEDED
Status: DISCONTINUED | OUTPATIENT
Start: 2023-11-12 | End: 2023-11-12

## 2023-11-11 RX ORDER — OXYTOCIN/RINGER'S LACTATE 30/500 ML
334 PLASTIC BAG, INJECTION (ML) INTRAVENOUS ONCE
Status: DISCONTINUED | OUTPATIENT
Start: 2023-11-11 | End: 2023-11-12

## 2023-11-11 RX ORDER — LIDOCAINE HYDROCHLORIDE 10 MG/ML
10 INJECTION INFILTRATION; PERINEURAL ONCE AS NEEDED
Status: DISCONTINUED | OUTPATIENT
Start: 2023-11-12 | End: 2023-11-12

## 2023-11-11 RX ORDER — SODIUM CHLORIDE, SODIUM LACTATE, POTASSIUM CHLORIDE, CALCIUM CHLORIDE 600; 310; 30; 20 MG/100ML; MG/100ML; MG/100ML; MG/100ML
INJECTION, SOLUTION INTRAVENOUS CONTINUOUS
Status: DISCONTINUED | OUTPATIENT
Start: 2023-11-12 | End: 2023-11-12

## 2023-11-11 RX ORDER — CALCIUM CARBONATE 200(500)MG
500 TABLET,CHEWABLE ORAL 3 TIMES DAILY PRN
Status: DISCONTINUED | OUTPATIENT
Start: 2023-11-12 | End: 2023-11-12

## 2023-11-11 RX ADMIN — ROPIVACAINE HYDROCHLORIDE 20 ML: 2 INJECTION, SOLUTION EPIDURAL; INFILTRATION at 11:11

## 2023-11-11 RX ADMIN — SODIUM CHLORIDE, SODIUM LACTATE, POTASSIUM CHLORIDE, AND CALCIUM CHLORIDE 1000 ML: .6; .31; .03; .02 INJECTION, SOLUTION INTRAVENOUS at 11:11

## 2023-11-12 ENCOUNTER — ANESTHESIA (OUTPATIENT)
Dept: OBSTETRICS AND GYNECOLOGY | Facility: HOSPITAL | Age: 38
End: 2023-11-12
Payer: OTHER GOVERNMENT

## 2023-11-12 LAB
ABO + RH BLD: ABNORMAL
AMPHET+METHAMPHET UR QL: NEGATIVE
BARBITURATES UR QL SCN>200 NG/ML: NEGATIVE
BASOPHILS # BLD AUTO: 0.04 K/UL (ref 0–0.2)
BASOPHILS NFR BLD: 0.4 % (ref 0–1.9)
BENZODIAZ UR QL SCN>200 NG/ML: NEGATIVE
BILIRUB UR QL STRIP: NEGATIVE
BLD GP AB SCN CELLS X3 SERPL QL: ABNORMAL
BLOOD GROUP ANTIBODIES SERPL: NORMAL
BUPRENORPHINE UR QL: NEGATIVE
BZE UR QL SCN: NEGATIVE
CANNABINOIDS UR QL SCN: NEGATIVE
CLARITY UR: CLEAR
COLOR UR: COLORLESS
CREAT UR-MCNC: 11 MG/DL (ref 15–325)
CREAT UR-MCNC: 11 MG/DL (ref 15–325)
DIFFERENTIAL METHOD: ABNORMAL
EOSINOPHIL # BLD AUTO: 0.1 K/UL (ref 0–0.5)
EOSINOPHIL NFR BLD: 0.8 % (ref 0–8)
ERYTHROCYTE [DISTWIDTH] IN BLOOD BY AUTOMATED COUNT: 13.2 % (ref 11.5–14.5)
GLUCOSE UR QL STRIP: NEGATIVE
HCT VFR BLD AUTO: 35.3 % (ref 37–48.5)
HGB BLD-MCNC: 12.9 G/DL (ref 12–16)
HGB UR QL STRIP: NEGATIVE
IMM GRANULOCYTES # BLD AUTO: 0.04 K/UL (ref 0–0.04)
IMM GRANULOCYTES NFR BLD AUTO: 0.4 % (ref 0–0.5)
KETONES UR QL STRIP: NEGATIVE
LEUKOCYTE ESTERASE UR QL STRIP: NEGATIVE
LYMPHOCYTES # BLD AUTO: 2 K/UL (ref 1–4.8)
LYMPHOCYTES NFR BLD: 22 % (ref 18–48)
MCH RBC QN AUTO: 35.2 PG (ref 27–31)
MCHC RBC AUTO-ENTMCNC: 36.5 G/DL (ref 32–36)
MCV RBC AUTO: 96 FL (ref 82–98)
MONOCYTES # BLD AUTO: 0.6 K/UL (ref 0.3–1)
MONOCYTES NFR BLD: 6.4 % (ref 4–15)
NEUTROPHILS # BLD AUTO: 6.4 K/UL (ref 1.8–7.7)
NEUTROPHILS NFR BLD: 70 % (ref 38–73)
NITRITE UR QL STRIP: NEGATIVE
NRBC BLD-RTO: 0 /100 WBC
OPIATES UR QL SCN: NEGATIVE
PCP UR QL SCN>25 NG/ML: NEGATIVE
PH UR STRIP: 7 [PH] (ref 5–8)
PLATELET # BLD AUTO: 131 K/UL (ref 150–450)
PMV BLD AUTO: 10.6 FL (ref 9.2–12.9)
PROT UR QL STRIP: NEGATIVE
RBC # BLD AUTO: 3.66 M/UL (ref 4–5.4)
SP GR UR STRIP: 1 (ref 1–1.03)
TOXICOLOGY INFORMATION: ABNORMAL
URN SPEC COLLECT METH UR: ABNORMAL
UROBILINOGEN UR STRIP-ACNC: NEGATIVE EU/DL
WBC # BLD AUTO: 9.21 K/UL (ref 3.9–12.7)

## 2023-11-12 PROCEDURE — 12000002 HC ACUTE/MED SURGE SEMI-PRIVATE ROOM

## 2023-11-12 PROCEDURE — 62326 NJX INTERLAMINAR LMBR/SAC: CPT | Performed by: ANESTHESIOLOGY

## 2023-11-12 PROCEDURE — 59409 PRA ETRICAL CARE,VAG DELIV ONLY: ICD-10-PCS | Mod: AA,,, | Performed by: ANESTHESIOLOGY

## 2023-11-12 PROCEDURE — 59409 OBSTETRICAL CARE: CPT | Mod: AA,,, | Performed by: ANESTHESIOLOGY

## 2023-11-12 PROCEDURE — 25000003 PHARM REV CODE 250: Performed by: STUDENT IN AN ORGANIZED HEALTH CARE EDUCATION/TRAINING PROGRAM

## 2023-11-12 PROCEDURE — 72200004 HC VAGINAL DELIVERY LEVEL I

## 2023-11-12 PROCEDURE — 36415 COLL VENOUS BLD VENIPUNCTURE: CPT | Performed by: SPECIALIST

## 2023-11-12 PROCEDURE — 63600175 PHARM REV CODE 636 W HCPCS: Performed by: STUDENT IN AN ORGANIZED HEALTH CARE EDUCATION/TRAINING PROGRAM

## 2023-11-12 PROCEDURE — 63600175 PHARM REV CODE 636 W HCPCS: Performed by: ANESTHESIOLOGY

## 2023-11-12 PROCEDURE — C1751 CATH, INF, PER/CENT/MIDLINE: HCPCS | Performed by: ANESTHESIOLOGY

## 2023-11-12 PROCEDURE — 27200710 HC EPIDURAL INFUSION PUMP SET: Performed by: ANESTHESIOLOGY

## 2023-11-12 RX ORDER — ONDANSETRON 4 MG/1
8 TABLET, ORALLY DISINTEGRATING ORAL EVERY 8 HOURS PRN
Status: DISCONTINUED | OUTPATIENT
Start: 2023-11-12 | End: 2023-11-13 | Stop reason: HOSPADM

## 2023-11-12 RX ORDER — OXYTOCIN/RINGER'S LACTATE 30/500 ML
95 PLASTIC BAG, INJECTION (ML) INTRAVENOUS ONCE
Status: DISCONTINUED | OUTPATIENT
Start: 2023-11-12 | End: 2023-11-13 | Stop reason: HOSPADM

## 2023-11-12 RX ORDER — OXYTOCIN/RINGER'S LACTATE 30/500 ML
334 PLASTIC BAG, INJECTION (ML) INTRAVENOUS ONCE AS NEEDED
Status: COMPLETED | OUTPATIENT
Start: 2023-11-12 | End: 2023-11-12

## 2023-11-12 RX ORDER — CARBOPROST TROMETHAMINE 250 UG/ML
250 INJECTION, SOLUTION INTRAMUSCULAR
Status: DISCONTINUED | OUTPATIENT
Start: 2023-11-12 | End: 2023-11-13 | Stop reason: HOSPADM

## 2023-11-12 RX ORDER — NALOXONE HCL 0.4 MG/ML
0.4 VIAL (ML) INJECTION SEE ADMIN INSTRUCTIONS
Status: DISCONTINUED | OUTPATIENT
Start: 2023-11-12 | End: 2023-11-12

## 2023-11-12 RX ORDER — MISOPROSTOL 200 UG/1
800 TABLET ORAL ONCE AS NEEDED
Status: COMPLETED | OUTPATIENT
Start: 2023-11-12 | End: 2023-11-12

## 2023-11-12 RX ORDER — DIPHENHYDRAMINE HYDROCHLORIDE 50 MG/ML
12.5 INJECTION INTRAMUSCULAR; INTRAVENOUS EVERY 4 HOURS PRN
Status: DISCONTINUED | OUTPATIENT
Start: 2023-11-12 | End: 2023-11-12

## 2023-11-12 RX ORDER — HYDROCORTISONE 25 MG/G
CREAM TOPICAL 3 TIMES DAILY PRN
Status: DISCONTINUED | OUTPATIENT
Start: 2023-11-12 | End: 2023-11-13 | Stop reason: HOSPADM

## 2023-11-12 RX ORDER — IBUPROFEN 400 MG/1
800 TABLET ORAL EVERY 6 HOURS PRN
Status: DISCONTINUED | OUTPATIENT
Start: 2023-11-12 | End: 2023-11-13 | Stop reason: HOSPADM

## 2023-11-12 RX ORDER — OXYTOCIN 10 [USP'U]/ML
10 INJECTION, SOLUTION INTRAMUSCULAR; INTRAVENOUS ONCE AS NEEDED
Status: DISCONTINUED | OUTPATIENT
Start: 2023-11-12 | End: 2023-11-13 | Stop reason: HOSPADM

## 2023-11-12 RX ORDER — OXYCODONE AND ACETAMINOPHEN 5; 325 MG/1; MG/1
1 TABLET ORAL EVERY 4 HOURS PRN
Status: DISCONTINUED | OUTPATIENT
Start: 2023-11-12 | End: 2023-11-13 | Stop reason: HOSPADM

## 2023-11-12 RX ORDER — MISOPROSTOL 200 UG/1
800 TABLET ORAL ONCE AS NEEDED
Status: DISCONTINUED | OUTPATIENT
Start: 2023-11-12 | End: 2023-11-13 | Stop reason: HOSPADM

## 2023-11-12 RX ORDER — METHYLERGONOVINE MALEATE 0.2 MG/ML
200 INJECTION INTRAVENOUS ONCE AS NEEDED
Status: DISCONTINUED | OUTPATIENT
Start: 2023-11-12 | End: 2023-11-13 | Stop reason: HOSPADM

## 2023-11-12 RX ORDER — ROPIVACAINE HYDROCHLORIDE 2 MG/ML
INJECTION, SOLUTION EPIDURAL; INFILTRATION
Status: COMPLETED | OUTPATIENT
Start: 2023-11-12 | End: 2023-11-12

## 2023-11-12 RX ORDER — SIMETHICONE 80 MG
1 TABLET,CHEWABLE ORAL EVERY 6 HOURS PRN
Status: DISCONTINUED | OUTPATIENT
Start: 2023-11-12 | End: 2023-11-13 | Stop reason: HOSPADM

## 2023-11-12 RX ORDER — DIPHENOXYLATE HYDROCHLORIDE AND ATROPINE SULFATE 2.5; .025 MG/1; MG/1
2 TABLET ORAL EVERY 6 HOURS PRN
Status: DISCONTINUED | OUTPATIENT
Start: 2023-11-12 | End: 2023-11-13 | Stop reason: HOSPADM

## 2023-11-12 RX ORDER — DIPHENHYDRAMINE HCL 25 MG
25 CAPSULE ORAL EVERY 4 HOURS PRN
Status: DISCONTINUED | OUTPATIENT
Start: 2023-11-12 | End: 2023-11-13 | Stop reason: HOSPADM

## 2023-11-12 RX ORDER — PRENATAL WITH FERROUS FUM AND FOLIC ACID 3080; 920; 120; 400; 22; 1.84; 3; 20; 10; 1; 12; 200; 27; 25; 2 [IU]/1; [IU]/1; MG/1; [IU]/1; MG/1; MG/1; MG/1; MG/1; MG/1; MG/1; UG/1; MG/1; MG/1; MG/1; MG/1
1 TABLET ORAL DAILY
Status: DISCONTINUED | OUTPATIENT
Start: 2023-11-12 | End: 2023-11-13 | Stop reason: HOSPADM

## 2023-11-12 RX ORDER — DOCUSATE SODIUM 100 MG/1
200 CAPSULE, LIQUID FILLED ORAL 2 TIMES DAILY PRN
Status: DISCONTINUED | OUTPATIENT
Start: 2023-11-12 | End: 2023-11-13 | Stop reason: HOSPADM

## 2023-11-12 RX ORDER — FENTANYL/BUPIVACAINE/NS/PF 2MCG/ML-.1
14 PLASTIC BAG, INJECTION (ML) INJECTION CONTINUOUS
Status: DISCONTINUED | OUTPATIENT
Start: 2023-11-12 | End: 2023-11-12

## 2023-11-12 RX ORDER — OXYCODONE AND ACETAMINOPHEN 10; 325 MG/1; MG/1
1 TABLET ORAL EVERY 4 HOURS PRN
Status: DISCONTINUED | OUTPATIENT
Start: 2023-11-12 | End: 2023-11-13 | Stop reason: HOSPADM

## 2023-11-12 RX ORDER — OXYTOCIN/RINGER'S LACTATE 30/500 ML
95 PLASTIC BAG, INJECTION (ML) INTRAVENOUS ONCE AS NEEDED
Status: COMPLETED | OUTPATIENT
Start: 2023-11-12 | End: 2023-11-12

## 2023-11-12 RX ORDER — ONDANSETRON 2 MG/ML
4 INJECTION INTRAMUSCULAR; INTRAVENOUS EVERY 6 HOURS PRN
Status: DISCONTINUED | OUTPATIENT
Start: 2023-11-12 | End: 2023-11-12

## 2023-11-12 RX ORDER — EPHEDRINE SULFATE 50 MG/ML
10 INJECTION, SOLUTION INTRAVENOUS ONCE AS NEEDED
Status: DISCONTINUED | OUTPATIENT
Start: 2023-11-12 | End: 2023-11-12

## 2023-11-12 RX ORDER — PROCHLORPERAZINE EDISYLATE 5 MG/ML
5 INJECTION INTRAMUSCULAR; INTRAVENOUS EVERY 6 HOURS PRN
Status: DISCONTINUED | OUTPATIENT
Start: 2023-11-12 | End: 2023-11-13 | Stop reason: HOSPADM

## 2023-11-12 RX ORDER — TRANEXAMIC ACID 10 MG/ML
1000 INJECTION, SOLUTION INTRAVENOUS EVERY 30 MIN PRN
Status: DISCONTINUED | OUTPATIENT
Start: 2023-11-12 | End: 2023-11-13 | Stop reason: HOSPADM

## 2023-11-12 RX ADMIN — SODIUM CHLORIDE, SODIUM LACTATE, POTASSIUM CHLORIDE, AND CALCIUM CHLORIDE 500 ML: .6; .31; .03; .02 INJECTION, SOLUTION INTRAVENOUS at 12:11

## 2023-11-12 RX ADMIN — IBUPROFEN 800 MG: 400 TABLET, FILM COATED ORAL at 06:11

## 2023-11-12 RX ADMIN — ROPIVACAINE HYDROCHLORIDE 15 ML: 2 INJECTION, SOLUTION EPIDURAL; INFILTRATION at 12:11

## 2023-11-12 RX ADMIN — IBUPROFEN 800 MG: 400 TABLET, FILM COATED ORAL at 09:11

## 2023-11-12 RX ADMIN — Medication 95 MILLI-UNITS/MIN: at 02:11

## 2023-11-12 RX ADMIN — BENZOCAINE AND LEVOMENTHOL: 200; 5 SPRAY TOPICAL at 06:11

## 2023-11-12 RX ADMIN — MISOPROSTOL 800 MCG: 200 TABLET ORAL at 01:11

## 2023-11-12 RX ADMIN — Medication 334 MILLI-UNITS/MIN: at 01:11

## 2023-11-12 RX ADMIN — IBUPROFEN 800 MG: 400 TABLET, FILM COATED ORAL at 01:11

## 2023-11-12 RX ADMIN — PRENATAL VIT W/ FE FUMARATE-FA TAB 27-0.8 MG 1 TABLET: 27-0.8 TAB at 10:11

## 2023-11-12 NOTE — NURSING
Nurses Note -- 4 Eyes      11/12/2023   6:18 AM      Skin assessed during: Admit      [x] No Altered Skin Integrity Present    [x]Prevention Measures Documented      [] Yes- Altered Skin Integrity Present or Discovered   [] LDA Added if Not in Epic (Describe Wound)   [] New Altered Skin Integrity was Present on Admit and Documented in LDA   [] Wound Image Taken    Wound Care Consulted? No    Attending Nurse:  Tracy English RN       Second RN/Staff Member:   Andie Patel < RN

## 2023-11-12 NOTE — NURSING TRANSFER
Nursing Transfer Note      11/12/2023   6:19 AM    Nurse giving handoff:Tracy English RN   Nurse receiving handoff:Tricia    Reason patient is being transferred: postpartum    Transfer To: 2103    Transfer via wheelchair    Transfer with patient belongings    Transported by ELMA/GINA English     Transfer Vital Signs:  Blood Pressure:103/69  Heart Rate:67  O2:100  Temperature:98.6  Respirations:18            4eyes on Skin: yes    Medicines sent: none    Any special needs or follow-up needed: none    Patient belongings transferred with patient: Yes    Chart send with patient: Yes    Notified: spouse    Patient reassessed at: 11/12/23 @ 0610 (date, time)  1  Upon arrival to floor: patient oriented to room, call bell in reach, and bed in lowest position

## 2023-11-12 NOTE — ANESTHESIA PREPROCEDURE EVALUATION
"                                                                                                             11/12/2023  Halley Mariee is a 37 y.o., female.    Patient Active Problem List   Diagnosis    Multigravida of advanced maternal age in third trimester    Graves disease    Asthma affecting pregnancy in third trimester       Past Surgical History:   Procedure Laterality Date    LAPAROSCOPIC APPENDECTOMY Right         Tobacco Use:  The patient  reports that she has never smoked. She has never used smokeless tobacco.     No results found for this or any previous visit.          Lab Results   Component Value Date    WBC 9.21 11/11/2023    HGB 12.9 11/11/2023    HCT 35.3 (L) 11/11/2023    MCV 96 11/11/2023     (L) 11/11/2023     BMP  No results found for: "NA", "K", "CL", "CO2", "BUN", "CREATININE", "CALCIUM", "ANIONGAP", "GLU"    No results found for this or any previous visit.            Pre-op Assessment    I have reviewed the Patient Summary Reports.     I have reviewed the Nursing Notes. I have reviewed the NPO Status.   I have reviewed the Medications.     Review of Systems  Anesthesia Hx:  No problems with previous Anesthesia  Denies Family Hx of Anesthesia complications.   Denies Personal Hx of Anesthesia complications.   Social:  Non-Smoker    Hematology/Oncology:  Hematology Normal      Hematology Comments: Mild throbocytopenia   EENT/Dental:EENT/Dental Normal   Cardiovascular:  Cardiovascular Normal     Pulmonary:   Asthma    Renal/:  Renal/ Normal     Hepatic/GI:  Hepatic/GI Normal    Musculoskeletal:  Musculoskeletal Normal    Neurological:  Neurology Normal    Endocrine:  Endocrine Normal    Psych:   depression          Physical Exam  General: Well nourished    Airway:  Mallampati: II   Mouth Opening: Normal  TM Distance: Normal  Tongue: Normal  Neck ROM: Normal ROM    Dental:  Intact    Chest/Lungs:  Clear to auscultation, Normal Respiratory Rate    Heart:  Rate: Normal  Rhythm: " Regular Rhythm        Anesthesia Plan  Type of Anesthesia, risks & benefits discussed:    Anesthesia Type: Epidural  Intra-op Monitoring Plan: Standard ASA Monitors  Post Op Pain Control Plan: IV/PO Opioids PRN  Informed Consent: Informed consent signed with the Patient and all parties understand the risks and agree with anesthesia plan.  All questions answered.   ASA Score: 2    Ready For Surgery From Anesthesia Perspective.     .

## 2023-11-12 NOTE — ANESTHESIA PROCEDURE NOTES
Epidural    Patient location during procedure: OB   Reason for block: primary anesthetic   Reason for block: labor analgesia requested by patient and obstetrician  Diagnosis: IUP   Start time: 11/12/2023 12:20 AM  Timeout: 11/12/2023 12:19 AM  End time: 11/12/2023 12:28 AM    Staffing  Performing Provider: Neno Greenwood MD  Authorizing Provider: Neno Greenwood MD    Staffing  Performed by: Neno Greenwood MD  Authorized by: Neno Greenwood MD        Preanesthetic Checklist  Completed: patient identified, IV checked, site marked, risks and benefits discussed, surgical consent, monitors and equipment checked, pre-op evaluation, timeout performed, anesthesia consent given, hand hygiene performed and patient being monitored  Preparation  Patient position: sitting  Prep: Betadine  Patient monitoring: ECG and Blood Pressure  Reason for block: primary anesthetic   Epidural  Skin Anesthetic: lidocaine 1%  Skin Wheal: 3 mL  Administration type: continuous  Approach: midline  Interspace: L3-4    Injection technique: ELIZABETH air  Needle and Epidural Catheter  Needle type: Tuohy   Needle gauge: 17  Needle length: 3.5 inches  Needle insertion depth: 8 cm  Catheter type: springwound and multi-orifice  Catheter size: 19 G  Catheter at skin depth: 9 cm  Insertion Attempts: 1  Test dose: 3 mL of lidocaine 1.5% with Epi 1-to-200,000  Additional Documentation: no paresthesia on injection, negative aspiration for heme and CSF, no significant pain on injection and no significant complaints from patient  Needle localization: anatomical landmarks  Medications:  Volume per aspiration: 5 mL  Time between aspirations: 5 minutes   Assessment  Upper dermatomal levels - Left: T6  Right: T6   Dermatomal levels determined by alcohol wipe  Ease of block: easy  Patient's tolerance of the procedure: comfortable throughout block and no complaints  Additional Notes  Epidural dosed with Ropivacaine 0.2% x 5/5/5 mL. No inadvertent dural puncture with Tuohy.  Dural  puncture not performed with spinal needle    Medications:    Medications: ropivacaine (NAROPIN) solution 0.2% - Epidural   15 mL - 11/12/2023 12:28:00 PM

## 2023-11-12 NOTE — NURSING
[facility]  Department of Obstetrics and Gynecology  Labor & Delivery Triage Assessment    PATIENT NAME: Halley Mariee  MRN: [id number]  TODAY'S DATE: 2023    CHIEF COMPLAINT: Abdominal Pain      OB History    Para Term  AB Living   6 3 3 0 2 3   SAB IAB Ectopic Multiple Live Births   2 0 0 0 3      # Outcome Date GA Lbr Tarun/2nd Weight Sex Delivery Anes PTL Lv   6 Current            5 2022     SAB      4 Term 20 40w0d  3.459 kg (7 lb 10 oz) F Vag-Spont  N YAZAN   3 SAB 2019     SAB      2 Term 10/22/14 40w1d  3.289 kg (7 lb 4 oz) F Vag-Spont  N YAZAN   1 Term 06 40w1d  3.317 kg (7 lb 5 oz) F Vag-Spont  N YAZAN     Past Medical History:   Diagnosis Date    Asthma     Depression affecting pregnancy, postpartum     Graves disease      Past Surgical History:   Procedure Laterality Date    LAPAROSCOPIC APPENDECTOMY Right          VITAL SIGNS - ABNORMAL VITALS INCLUDE TEMP >100.4,RR <12 or >26, SUSTAINED MATERNAL PULSE <60 or >120     VITAL SIGNS (Most Recent)  Temp: 98.6 °F (37 °C) (23)  Pulse: 72 (23)  Resp: 18 (23)  BP: (!) 106/55 (23)  SpO2: 100 % (23 003)    VITAL SIGNS     normal  HEADACHE    no     VOMITING    no  VISUAL DISTURBANCES  no  EPIGASTRIC PAIN        no  PROTEINURIA 2+ or MORE             no   EDEMA FACE/EXTREMITIES            no    FETAL MOVEMENT     FETAL MOVEMENT: Active  FETAL HEART RATE BASELINE =  120  normal  FETAL HEART RATE VARIABILITY:  Moderate  FETAL HEART RATE ACCELERATIONS FOR GESTATIONAL AGE: present  FETAL HEART RATE DECELERATIONS: none    ABDOMINAL PAIN/CRAMPING/CONTRACTIONS     Patient is complaining of abdominal pain/cramping/contractions. Contraction pattern is regular, < or = 5 minutes apart. Contraction strength strong. Resting tone is relaxed. Abdominal palpation is non-tender.    RUPTURE OF MEMBRANES OR LEAKING OF AMNIOTIC FLUID     Patient denies ROM or leaking of amniotic  fluid.    VAGINAL BLEEDING     Patient denies vaginal bleeding.    VAGINAL EXAM     DILATION:  5  STATION:  80  EFFACEMENT:  0  PRESENTATION:  vertex    VAGINAL EXAM DEFERRED DUE TO:   na    PAIN PRESENT ON ARRIVAL     ONSET:   2200  LOCATION:  [facility]  PAIN SCALE (0-10):  7  DESCRIPTION: contractions    Interventions     None.      PATIENT DISPOSITION     Admitted to Labor & Delivery      Dr. Oviedo notified at 2330 of the above assessment.    Tracy English RN  [facility]  11/12/2023

## 2023-11-12 NOTE — L&D DELIVERY NOTE
"[facility]  Vaginal Delivery   Operative Note    SUMMARY   Patient labored to completion and began to push for spontaneous vaginal delivery.  Fetal head delivered over intact perineum in the ARLET position.  No nuchal cord.  With gentle downward traction the anterior shoulder delivered without difficulty followed by the posterior shoulder and remaining body.  Infant was placed on maternal abdomen for skin to skin and bulb suctioned.  Delayed cord clamping was performed and cord blood collected.  Spontaneous delivery of placenta and IV Pitocin initiated with good uterine tone. 1st degree laceration noted repaired with 2-0 Vicryl.  Hemostatic.  Patient tolerated delivery well.  Sponge, needle, and lap count correct. Placenta inspected and noted to be intact.     Indications: labor  Pregnancy complicated by:   Patient Active Problem List   Diagnosis    Multigravida of advanced maternal age in third trimester    Graves disease    Asthma affecting pregnancy in third trimester     (spontaneous vaginal delivery)     Admitting GA: 40w5d    Delivery Information for Cat Mariee    Birth information:  YOB: 2023   Time of birth: 1:24 AM   Sex: female   Head Delivery Date/Time: 2023  1:24 AM   Delivery type: Vaginal, Spontaneous   Gestational Age: 40w5d        Delivery Providers    Delivering clinician: Karley Oviedo MD   Provider Role    Tracy English RN Registered Nurse    Rosemary Hood, Patient Care Assistant Surgical Tech    Lizz Lacy, RN Pediatric Nursery              Measurements    Weight: 3925 g  Weight (lbs): 8 lb 10.5 oz  Length: 54.6 cm  Length (in): 21.5"         Apgars    Living status: Living  Apgar Component Scores:  1 min.:  5 min.:  10 min.:  15 min.:  20 min.:    Skin color:  1  1       Heart rate:  2  2       Reflex irritability:  2  2       Muscle tone:  2  2       Respiratory effort:  2  2       Total:  9  9       Apgars assigned by: " GINA MCDONNELL         Operative Delivery    Forceps attempted?: No  Vacuum extractor attempted?: No         Shoulder Dystocia    Shoulder dystocia present?: No           Presentation    Presentation: Vertex  Position: Right Occiput Anterior           Interventions/Resuscitation    Method: Bulb Suctioning, Tactile Stimulation       Cord    Vessels: 3 vessels  Complications: None  Delayed Cord Clamping?: Yes  Cord Clamped Date/Time: 2023  1:25 AM  Cord Blood Disposition: Sent with Baby  Gases Sent?: No  Stem Cell Collection (by MD): No       Placenta    Placenta delivery date/time: 2023 0128  Placenta removal: Spontaneous  Placenta appearance: Intact  Placenta disposition: Discarded           Labor Events:       labor: No     Labor Onset Date/Time: 2023 23:20     Dilation Complete Date/Time: 2023 01:10     Start Pushing Date/Time: 2023 01:11       Start Pushing Date/Time: 2023 01:11     Rupture Date/Time: 23  0052         Rupture type: ARM (Artificial Rupture)         Fluid Amount:       Fluid Color: Clear               steroids: None     Antibiotics given for GBS: No     Induction: none     Indications for induction:        Augmentation:       Indications for augmentation:       Labor complications: None     Additional complications:          Cervical ripening:                     Delivery:      Episiotomy: None     Indication for Episiotomy:       Perineal Lacerations: 1st Repaired:  Yes   Periurethral Laceration:   Repaired:     Labial Laceration:   Repaired:     Sulcus Laceration:   Repaired:     Vaginal Laceration:   Repaired:     Cervical Laceration:   Repaired:     Repair suture:       Repair # of packets: 1     Last Value - EBL - Nursing (mL):       Sum - EBL - Nursing (mL): 0     Last Value - EBL - Anesthesia (mL):      Calculated QBL (mL): 320 mL      Vaginal Sweep Performed: Yes     Surgicount Correct: Yes       Other providers:       Anesthesia     Method: Epidural          Details (if applicable):  Trial of Labor      Categorization:      Priority:     Indications for :     Incision Type:       Additional  information:  Forceps:    Vacuum:    Breech:    Observed anomalies    Other (Comments):       Karley Oviedo MD  Obstetrics and Gynecology  [facility]

## 2023-11-13 VITALS
DIASTOLIC BLOOD PRESSURE: 72 MMHG | HEART RATE: 62 BPM | SYSTOLIC BLOOD PRESSURE: 109 MMHG | HEIGHT: 69 IN | WEIGHT: 163 LBS | TEMPERATURE: 98 F | OXYGEN SATURATION: 99 % | RESPIRATION RATE: 16 BRPM | BODY MASS INDEX: 24.14 KG/M2

## 2023-11-13 LAB
ABO + RH BLD: ABNORMAL
BASOPHILS # BLD AUTO: 0.04 K/UL (ref 0–0.2)
BASOPHILS NFR BLD: 0.5 % (ref 0–1.9)
BLD GP AB SCN CELLS X3 SERPL QL: ABNORMAL
DIFFERENTIAL METHOD: ABNORMAL
EOSINOPHIL # BLD AUTO: 0.1 K/UL (ref 0–0.5)
EOSINOPHIL NFR BLD: 1 % (ref 0–8)
ERYTHROCYTE [DISTWIDTH] IN BLOOD BY AUTOMATED COUNT: 13.6 % (ref 11.5–14.5)
FETAL CELL SCN BLD QL ROSETTE: NORMAL
HCT VFR BLD AUTO: 33.6 % (ref 37–48.5)
HGB BLD-MCNC: 11.9 G/DL (ref 12–16)
IMM GRANULOCYTES # BLD AUTO: 0.03 K/UL (ref 0–0.04)
IMM GRANULOCYTES NFR BLD AUTO: 0.4 % (ref 0–0.5)
INJECT RH IG VOL PATIENT: NORMAL ML
LYMPHOCYTES # BLD AUTO: 2 K/UL (ref 1–4.8)
LYMPHOCYTES NFR BLD: 23.9 % (ref 18–48)
MCH RBC QN AUTO: 35.3 PG (ref 27–31)
MCHC RBC AUTO-ENTMCNC: 35.4 G/DL (ref 32–36)
MCV RBC AUTO: 100 FL (ref 82–98)
MONOCYTES # BLD AUTO: 0.6 K/UL (ref 0.3–1)
MONOCYTES NFR BLD: 6.9 % (ref 4–15)
NEUTROPHILS # BLD AUTO: 5.7 K/UL (ref 1.8–7.7)
NEUTROPHILS NFR BLD: 67.3 % (ref 38–73)
NRBC BLD-RTO: 0 /100 WBC
PLATELET # BLD AUTO: 122 K/UL (ref 150–450)
PMV BLD AUTO: 10.6 FL (ref 9.2–12.9)
RBC # BLD AUTO: 3.37 M/UL (ref 4–5.4)
WBC # BLD AUTO: 8.4 K/UL (ref 3.9–12.7)

## 2023-11-13 PROCEDURE — 85025 COMPLETE CBC W/AUTO DIFF WBC: CPT | Performed by: STUDENT IN AN ORGANIZED HEALTH CARE EDUCATION/TRAINING PROGRAM

## 2023-11-13 PROCEDURE — 86900 BLOOD TYPING SEROLOGIC ABO: CPT | Performed by: SPECIALIST

## 2023-11-13 PROCEDURE — 63600519 RHOGAM PHARM REV CODE 636 ALT 250 W HCPCS: Performed by: SPECIALIST

## 2023-11-13 PROCEDURE — 25000003 PHARM REV CODE 250: Performed by: STUDENT IN AN ORGANIZED HEALTH CARE EDUCATION/TRAINING PROGRAM

## 2023-11-13 PROCEDURE — 36415 COLL VENOUS BLD VENIPUNCTURE: CPT | Performed by: SPECIALIST

## 2023-11-13 RX ORDER — IBUPROFEN 800 MG/1
800 TABLET ORAL EVERY 6 HOURS PRN
Qty: 60 TABLET | Refills: 0 | Status: SHIPPED | OUTPATIENT
Start: 2023-11-13

## 2023-11-13 RX ADMIN — HUMAN RHO(D) IMMUNE GLOBULIN 300 MCG: 1500 SOLUTION INTRAMUSCULAR; INTRAVENOUS at 10:11

## 2023-11-13 RX ADMIN — IBUPROFEN 800 MG: 400 TABLET, FILM COATED ORAL at 04:11

## 2023-11-13 NOTE — DISCHARGE SUMMARY
Critical access hospital  Obstetrics  Discharge Summary      Patient Name: Halley Mariee  MRN: [id number]  Admission Date: 2023  Hospital Length of Stay: 2 days  Discharge Date and Time:  2023 12:32 PM  Attending Physician: Bienvenido Russell MD   Discharging Provider: Karley Oviedo MD, MD   Primary Care Provider: Keisha Shaw FNP-C        Hospital Course:   37-year-old G6 P 3-0 2 3 at 40 weeks and 5 days presented in labor.  Underwent uncomplicated vaginal delivery.  See delivery note details.  And well.  On postpartum day 2 she was meeting goals and desired discharge. Pain well controlled, tolerating regular diet, ambulating and voiding without difficulty, passing flatus, no heavy bleeding. VSS and exam reassuring.  Patient is Rh negative, infant is Rh positive.  Will receive RhoGAM prior to discharge.  Precautions reviewed with patient and she will follow up with Dr. Russell.    Vitals:    23 0004 23 0524 23 0719 23 1145   BP: 97/62 105/68 101/69 102/66   BP Location: Right arm Right arm Right arm Right arm   Patient Position: Sitting Sitting Sitting Sitting   Pulse: 63 71 78 66   Resp: 16 16 16 16   Temp: 97.9 °F (36.6 °C) 97.9 °F (36.6 °C) 97.9 °F (36.6 °C) 98.2 °F (36.8 °C)   TempSrc: Oral Oral Oral Oral   SpO2: 97% 99% 97% 98%   Weight:       Height:            Exam:  NAD, AAO  Regular rate  Nonlabored respirations  Ab: fundus firm below the umbilicus, appropriate abdominal tenderness  : appropriate lochia  Extremities:  Nontender calves, no evidence of DVT           Final Active Diagnoses:    Diagnosis Date Noted POA    PRINCIPAL PROBLEM:   (spontaneous vaginal delivery) [O80] 2023 Not Applicable      Problems Resolved During this Admission:        Significant Diagnostic Studies: Labs: CBC   Recent Labs   Lab 23  2349 23  0509   WBC 9.21 8.40   HGB 12.9 11.9*   HCT 35.3* 33.6*   * 122*     Lab Results   Component Value Date     "GROUPTRH B NEG 2023         Feeding Method: breast    Immunizations       Date Immunization Status Dose Route/Site Given by    23 MMR Incomplete 0.5 mL Subcutaneous/     23 Tdap Incomplete 0.5 mL Intramuscular/     23 1051 Rho (D) Immune Globulin - IM Given 300 mcg Intramuscular/Left Ventrogluteal Maria M Bragg RN            Delivery:    Episiotomy: None   Lacerations: 1st   Repair suture:     Repair # of packets: 1   Blood loss (ml):       Birth information:  YOB: 2023   Time of birth: 1:24 AM   Sex: female   Delivery type: Vaginal, Spontaneous   Gestational Age: 40w5d     Measurements    Weight: 3925 g  Weight (lbs): 8 lb 10.5 oz  Length: 54.6 cm  Length (in): 21.5"         Delivery Clinician: Delivery Providers    Delivering clinician: Karley Oviedo MD   Provider Role    Tracy English RN Registered Nurse    Rosemary Hood, Patient Care Assistant Surgical Tech    Lizz Lacy RN Pediatric Nursery             Additional  information:  Forceps:    Vacuum:    Breech:    Observed anomalies      Living?:     Apgars    Living status: Living  Apgar Component Scores:  1 min.:  5 min.:  10 min.:  15 min.:  20 min.:    Skin color:  1  1       Heart rate:  2  2       Reflex irritability:  2  2       Muscle tone:  2  2       Respiratory effort:  2  2       Total:  9  9       Apgars assigned by: GINA MCDONNELL         Placenta: Delivered:       appearance  Pending Diagnostic Studies:       Procedure Component Value Units Date/Time    RPR [[id number]] Collected: 23    Order Status: Sent Lab Status: In process Updated: 23    Specimen: Blood             Discharged Condition: good    Disposition: Home or Self Care    Follow Up:   Follow-up Information       Bienvenido Russell MD Follow up in 6 week(s).    Specialty: Obstetrics and Gynecology  Why: Postpartum check  Contact information:  [address]  [place], [place] " [place] [id number]  [phone]                           Patient Instructions:      Diet Adult Regular     Pelvic Rest     Notify your health care provider if you experience any of the following:  temperature >100.4     Notify your health care provider if you experience any of the following:  persistent nausea and vomiting or diarrhea     Notify your health care provider if you experience any of the following:  severe uncontrolled pain     Notify your health care provider if you experience any of the following:  difficulty breathing or increased cough     Notify your health care provider if you experience any of the following:  severe persistent headache     Notify your health care provider if you experience any of the following:  worsening rash     Notify your health care provider if you experience any of the following:  persistent dizziness, light-headedness, or visual disturbances     Notify your health care provider if you experience any of the following:  increased confusion or weakness     Activity as tolerated     Medications:  Current Discharge Medication List        START taking these medications    Details   ibuprofen (ADVIL,MOTRIN) 800 MG tablet Take 1 tablet (800 mg total) by mouth every 6 (six) hours as needed (cramping).  Qty: 60 tablet, Refills: 0           CONTINUE these medications which have NOT CHANGED    Details   SYNTHROID 150 mcg tablet Brand Only. Take 1 tablets 150 mcg by mouth daily.  Qty: 90 tablet, Refills: 1    Associated Diagnoses: Acquired hypothyroidism      albuterol (PROVENTIL/VENTOLIN HFA) 90 mcg/actuation inhaler Inhale 2 puffs into the lungs every 6 (six) hours as needed for Wheezing or Shortness of Breath.  Qty: 18 g, Refills: 5    Associated Diagnoses: Moderate persistent asthma with status asthmaticus      CALCIUM-MAGNESIUM-VITAMIN D2 ORAL Take by mouth.      cetirizine 10 mg Cap Take by mouth.      fluticasone-salmeterol diskus inhaler 100-50 mcg Inhale 1 puff into the  lungs 2 (two) times daily. Controller  Qty: 60 each, Refills: 5    Associated Diagnoses: Moderate persistent asthma with status asthmaticus      montelukast (SINGULAIR) 10 mg tablet Take 1 tablet by mouth once daily.      multivitamin-Ca-iron-minerals 18-0.4 mg Tab Take by mouth.      prenatal vit no.124/iron/folic (PRENATAL VITAMIN ORAL) Take by mouth Daily.      sertraline (ZOLOFT) 50 MG tablet Take 1 tablet by mouth once daily.           STOP taking these medications       folic acid (FOLVITE) 400 MCG tablet Comments:   Reason for Stopping:         multivitamin-iron-folic acid Tab Comments:   Reason for Stopping:               Karley Oviedo MD, MD  Obstetrics  [facility]

## 2023-11-13 NOTE — LACTATION NOTE
11/13/23 1030   Maternal Assessment   Breast Density Bilateral:;soft   Areola Bilateral:;elastic   Nipples Bilateral:;everted   Right Nipple Symptoms redness;tender   Maternal Infant Feeding   Maternal Emotional State independent   Infant Positioning cradle   Signs of Milk Transfer audible swallow;infant jaw motion present   Pain with Feeding yes   Pain Location nipple, right   Comfort Measures Before/During Feeding infant position adjusted;latch adjusted;maternal position adjusted   Latch Assistance yes     Patient breastfeeding on right side in cradle position. Latch appears shallow and mother complains of nipple pain during feeding. Latch broken and baby latched back to breast deeply, nursing well with audible swallows. Mother states nipple pain decreased with deep latch. Emphasized importance of deep latch with every feeding and provided Hydrogel pad to promote healing. Reviewed basic breastfeeding instructions and encouraged to call me for any further breastfeeding assistance. Patient verbalizes understanding of all instructions with good recall.    Instructed on proper latch to facilitate effective breastfeeding.  Discussed recognizing hunger cues, appropriate positioning and wide mouth latch.  Discussed ways to determine an effective latch including:  areola included in latch, rhythmic/nutritive sucking and audible swallowing.  Also discussed soreness/tenderness associated with latch and prevention and treatment.  Pt states understanding and verbalized appropriate recall.

## 2023-11-13 NOTE — PROGRESS NOTES
Vaginal Delivery Postpartum Day 1     Halley Mariee is doing well without complaints. Pain well controlled. Tolerating regular diet. Ambulating and voiding without difficulty. She denies any problems with pp blues. States bleeding is not heavy.     OBJECTIVE:     Vitals:    23 0004 23 0524 23 0719 23 1145   BP: 97/62 105/68 101/69 102/66   BP Location: Right arm Right arm Right arm Right arm   Patient Position: Sitting Sitting Sitting Sitting   Pulse: 63 71 78 66   Resp: 16 16 16 16   Temp: 97.9 °F (36.6 °C) 97.9 °F (36.6 °C) 97.9 °F (36.6 °C) 98.2 °F (36.8 °C)   TempSrc: Oral Oral Oral Oral   SpO2: 97% 99% 97% 98%   Weight:       Height:            I & O (Last 24H):  Intake/Output Summary (Last 24 hours)    Intake/Output Summary (Last 24 hours) at 2023 1229  Last data filed at 2023 1324  Gross per 24 hour   Intake --   Output 600 ml   Net -600 ml         Physical Exam:  General: NAD, AAO   CV: Regular rate   Resp:   Nonlabored respirations   Abdomen: Soft, appropriately tender    Fundus: Firm   Lochia:  Appropriate   DVT Evaluation: No evidence of DVT on either side seen on physical exam.  No calf tenderness     Labs:  CBC:   Lab Results   Component Value Date/Time    WBC 8.40 2023 05:09 AM    RBC 3.37 (L) 2023 05:09 AM    HGB 11.9 (L) 2023 05:09 AM    HCT 33.6 (L) 2023 05:09 AM     (L) 2023 05:09 AM     (H) 2023 05:09 AM    MCH 35.3 (H) 2023 05:09 AM    MCHC 35.4 2023 05:09 AM       ABO/Rh  B NEG        ASSESSMENT/PLAN:     S/p vaginal delivery, PPD# 1. Doing well.   Patient Active Problem List   Diagnosis    Multigravida of advanced maternal age in third trimester    Graves disease    Asthma affecting pregnancy in third trimester     (spontaneous vaginal delivery)        Routine postpartal care   Baby doing well  Dispo: anticipate discharge today per patient request since she is meeting all criteria    Karley  MD Ivelisse  Obstetrics and Gynecology  [facility]

## 2023-11-13 NOTE — NURSING
Vaginal delivery discharge instructions given to Halley, verbalizes understanding. Questions answered, no further questions. Prescription to be brought to room by pharmacy.

## 2023-11-13 NOTE — ANESTHESIA POSTPROCEDURE EVALUATION
Anesthesia Post Evaluation    Patient: Halley Mariee    Procedure(s) Performed: * No procedures listed *    Final Anesthesia Type: epidural      Patient location during evaluation: floor  Patient participation: Yes- Able to Participate  Level of consciousness: awake and alert  Post-procedure vital signs: reviewed and stable  Pain management: adequate  Airway patency: patent    PONV status at discharge: No PONV  Anesthetic complications: no      Cardiovascular status: stable  Respiratory status: unassisted  Hydration status: euvolemic  Follow-up not needed.    Both lower extremities back to normal from labor epidural.  Ambulating well. No headache.  Patient reports some pain across her lower back but no radiation and slowly improving since delivery.  No anesthesia complications.        Vitals Value Taken Time   /69 11/13/23 0719   Temp 36.6 °C (97.9 °F) 11/13/23 0719   Pulse 78 11/13/23 0719   Resp 16 11/13/23 0719   SpO2 97 % 11/13/23 0719         No case tracking events are documented in the log.      Pain/Marcelo Score: Pain Rating Prior to Med Admin: 4 (11/13/2023  4:24 AM)  Pain Rating Post Med Admin: 0 (11/13/2023  5:24 AM)

## 2023-11-13 NOTE — DISCHARGE INSTRUCTIONS

## 2023-11-13 NOTE — PLAN OF CARE
VSS   Fundus firm and midline, below umbilicus with light lochia  Pain controlled with motrin as needed  Halley is ambulating independently, voiding and tolerating a regular diet  She is breastfeeding and bonding with baby  She is happy to be discharged today

## 2023-11-14 LAB — RPR SER QL: NORMAL

## 2024-01-11 ENCOUNTER — OFFICE VISIT (OUTPATIENT)
Dept: FAMILY MEDICINE | Facility: CLINIC | Age: 39
End: 2024-01-11
Payer: OTHER GOVERNMENT

## 2024-01-11 ENCOUNTER — LAB VISIT (OUTPATIENT)
Dept: LAB | Facility: HOSPITAL | Age: 39
End: 2024-01-11
Attending: INTERNAL MEDICINE
Payer: OTHER GOVERNMENT

## 2024-01-11 VITALS
BODY MASS INDEX: 20.88 KG/M2 | HEIGHT: 69 IN | SYSTOLIC BLOOD PRESSURE: 110 MMHG | OXYGEN SATURATION: 99 % | HEART RATE: 72 BPM | WEIGHT: 141 LBS | TEMPERATURE: 98 F | DIASTOLIC BLOOD PRESSURE: 60 MMHG

## 2024-01-11 DIAGNOSIS — E03.9 ACQUIRED HYPOTHYROIDISM: ICD-10-CM

## 2024-01-11 DIAGNOSIS — Z00.00 ANNUAL PHYSICAL EXAM: Primary | ICD-10-CM

## 2024-01-11 DIAGNOSIS — F41.1 GAD (GENERALIZED ANXIETY DISORDER): ICD-10-CM

## 2024-01-11 DIAGNOSIS — R45.89 DEPRESSED MOOD: ICD-10-CM

## 2024-01-11 PROCEDURE — 36415 COLL VENOUS BLD VENIPUNCTURE: CPT | Performed by: NURSE PRACTITIONER

## 2024-01-11 PROCEDURE — 84439 ASSAY OF FREE THYROXINE: CPT | Performed by: NURSE PRACTITIONER

## 2024-01-11 PROCEDURE — 84443 ASSAY THYROID STIM HORMONE: CPT | Performed by: NURSE PRACTITIONER

## 2024-01-11 PROCEDURE — 99999 PR PBB SHADOW E&M-EST. PATIENT-LVL IV: CPT | Mod: PBBFAC,,, | Performed by: NURSE PRACTITIONER

## 2024-01-11 PROCEDURE — 99395 PREV VISIT EST AGE 18-39: CPT | Mod: S$PBB,,, | Performed by: NURSE PRACTITIONER

## 2024-01-11 PROCEDURE — 99214 OFFICE O/P EST MOD 30 MIN: CPT | Mod: PBBFAC,PN | Performed by: NURSE PRACTITIONER

## 2024-01-11 RX ORDER — SERTRALINE HYDROCHLORIDE 50 MG/1
50 TABLET, FILM COATED ORAL DAILY
Qty: 90 TABLET | Refills: 1 | Status: SHIPPED | OUTPATIENT
Start: 2024-01-11

## 2024-01-11 RX ORDER — LEVOTHYROXINE SODIUM 150 MCG
TABLET ORAL
Qty: 90 TABLET | Refills: 1 | Status: SHIPPED | OUTPATIENT
Start: 2024-01-11 | End: 2024-02-02

## 2024-01-11 NOTE — PROGRESS NOTES
HPI: Halley Mariee  is a 38 y.o. female who presents for annual physical .  No complaints at this time.     Review of Systems   Constitutional:  Negative for activity change, appetite change and fever.   HENT:  Negative for congestion, ear discharge, ear pain, sore throat, trouble swallowing and voice change.    Eyes:  Negative for photophobia, pain, discharge and visual disturbance.   Respiratory:  Negative for cough, chest tightness and shortness of breath.    Cardiovascular:  Negative for chest pain and palpitations.   Gastrointestinal:  Negative for abdominal pain, nausea and vomiting.   Endocrine: Negative for cold intolerance and heat intolerance.        Hypothyroidism   Genitourinary:  Negative for difficulty urinating and dysuria.   Musculoskeletal:  Negative for arthralgias and gait problem.   Skin:  Negative for rash.   Allergic/Immunologic: Negative for immunocompromised state.   Neurological:  Negative for speech difficulty and headaches.   Psychiatric/Behavioral:  Positive for dysphoric mood (controlled). Negative for confusion, self-injury and suicidal ideas. The patient is nervous/anxious (controlled).      Review of patient's allergies indicates:   Allergen Reactions    Animal dander Itching, Rash and Shortness Of Breath    Bee pollen      Wheezing   Trigger asthma    Chaste tree extract      Wheezing   Trigger asthma    Grass pollen-red top, standard      Wheezing   Trigger asthma    Mite extract      Wheezing   Trigger asthma    Mold      Wheezing   Trigger asthma     Past Medical History:   Diagnosis Date    Asthma     Depression affecting pregnancy, postpartum     Graves disease      Past Surgical History:   Procedure Laterality Date    LAPAROSCOPIC APPENDECTOMY Right      Family History   Problem Relation Age of Onset    Hypertension Mother     Diabetes Mother     Hypothyroidism Father     Vitiligo Father     Hashimoto's thyroiditis Sister     Asthma Sister     Epilepsy Brother     Asthma  Daughter         1 daugther    Hypertension Maternal Grandmother     Alzheimer's disease Maternal Grandmother     Hyperlipidemia Maternal Grandmother     Hypertension Maternal Grandfather         passed away lung cancer    Cirrhosis Paternal Grandmother      Social History     Tobacco Use    Smoking status: Never    Smokeless tobacco: Never   Substance Use Topics    Alcohol use: Not Currently    Drug use: Not Currently      Health Maintenance Topics with due status: Not Due       Topic Last Completion Date    TETANUS VACCINE 04/01/2020     Immunization History   Administered Date(s) Administered    COVID-19, MRNA, LN-S, PF (Pfizer) (Purple Cap) 04/07/2021    Hepatitis B, Adult 04/18/2011    Influenza 09/18/2019    Influenza - Quadrivalent - PF *Preferred* (6 months and older) 11/16/2022, 11/04/2023    MMR 03/25/1987    Rho (D) Immune Globulin - IM 11/13/2023    Tdap 07/06/2016, 04/01/2020    Varicella 01/01/1990     OBJECTIVE:      Vitals:    01/11/24 1428   BP: 110/60   Pulse: 72   Temp: 97.9 °F (36.6 °C)     Physical Exam  Vitals and nursing note reviewed.   Constitutional:       General: She is not in acute distress.     Appearance: Normal appearance. She is well-developed.   HENT:      Head: Normocephalic and atraumatic.      Right Ear: External ear normal.      Left Ear: External ear normal.      Nose: Nose normal.      Mouth/Throat:      Mouth: Mucous membranes are moist.      Pharynx: Oropharynx is clear.   Eyes:      General: Lids are normal. Lids are everted, no foreign bodies appreciated.      Conjunctiva/sclera: Conjunctivae normal.      Pupils: Pupils are equal, round, and reactive to light.      Right eye: Pupil is round and reactive.      Left eye: Pupil is round and reactive.   Neck:      Trachea: Trachea normal.   Cardiovascular:      Rate and Rhythm: Normal rate and regular rhythm.      Pulses: Normal pulses.      Heart sounds: Normal heart sounds, S1 normal and S2 normal.   Pulmonary:      Effort:  Pulmonary effort is normal. No respiratory distress.      Breath sounds: Normal breath sounds.   Abdominal:      General: Abdomen is flat. Bowel sounds are normal.      Palpations: Abdomen is soft. Abdomen is not rigid.      Tenderness: There is no guarding.   Musculoskeletal:         General: Normal range of motion.      Cervical back: Normal range of motion and neck supple. No muscular tenderness.   Lymphadenopathy:      Cervical: No cervical adenopathy.   Skin:     General: Skin is warm and dry.      Capillary Refill: Capillary refill takes less than 2 seconds.   Neurological:      General: No focal deficit present.      Mental Status: She is alert and oriented to person, place, and time.   Psychiatric:         Mood and Affect: Mood is anxious (controlled with zoloft) and depressed (controlled with zoloft).         Behavior: Behavior normal. Behavior is cooperative.         Thought Content: Thought content normal.         Judgment: Judgment normal.        Assessment:       1. Annual physical exam    2. FABIOLA (generalized anxiety disorder)    3. Depressed mood    4. Acquired hypothyroidism    5. BMI 20.0-20.9, adult        Plan:       Annual physical exam  Completed    FABIOLA (generalized anxiety disorder)  -     sertraline (ZOLOFT) 50 MG tablet; Take 1 tablet (50 mg total) by mouth once daily.  Dispense: 90 tablet; Refill: 1    Depressed mood  -     sertraline (ZOLOFT) 50 MG tablet; Take 1 tablet (50 mg total) by mouth once daily.  Dispense: 90 tablet; Refill: 1  Restarted after deliver of last baby in November. Has been taking 50 mg for 6 weeks.  Stable.  Wishes to continue.    Acquired hypothyroidism  -     SYNTHROID 150 mcg tablet; Brand Only. Take 1 tablets 150 mcg by mouth daily.  Dispense: 90 tablet; Refill: 1  -     T4, Free; Future; Expected date: 01/11/2024  -     TSH; Future; Expected date: 01/11/2024    Obtain labs at the clinic here before leaving the office visit.  Will notify of results.    BMI  20.0-20.9, adult  Healthy diet and lifestyle advised.      Patient counseled on age appropriate medical preventative services, age appropriate cancer screenings, nutrition, healthy diet, consistent exercise regimen and maintaining an active lifestyle.      Counseled on age appropriate vaccines and discussed upcoming health care needs based on age/gender.  Spent time with patient counseling on need for a good patient/doctor relationship moving forward.  Discussed use of common OTC medications and supplements.  Discussed common dietary aids and use of caffeine and the need for good sleep hygiene and stress management.    Follow up in about 6 months (around 7/11/2024) for anxiety, thyroid.      1/11/2024 TERESA Isaac, FNP-C

## 2024-01-12 LAB
T4 FREE SERPL-MCNC: 1.22 NG/DL (ref 0.71–1.51)
TSH SERPL DL<=0.005 MIU/L-ACNC: <0.01 UIU/ML (ref 0.4–4)

## 2024-02-02 ENCOUNTER — OFFICE VISIT (OUTPATIENT)
Dept: FAMILY MEDICINE | Facility: CLINIC | Age: 39
End: 2024-02-02
Payer: OTHER GOVERNMENT

## 2024-02-02 VITALS
WEIGHT: 139 LBS | OXYGEN SATURATION: 98 % | BODY MASS INDEX: 20.59 KG/M2 | DIASTOLIC BLOOD PRESSURE: 60 MMHG | TEMPERATURE: 98 F | HEIGHT: 69 IN | SYSTOLIC BLOOD PRESSURE: 100 MMHG | HEART RATE: 63 BPM

## 2024-02-02 DIAGNOSIS — Z13.220 LIPID SCREENING: ICD-10-CM

## 2024-02-02 DIAGNOSIS — E03.9 ACQUIRED HYPOTHYROIDISM: ICD-10-CM

## 2024-02-02 DIAGNOSIS — F41.1 GAD (GENERALIZED ANXIETY DISORDER): Primary | ICD-10-CM

## 2024-02-02 DIAGNOSIS — R45.89 DEPRESSED MOOD: ICD-10-CM

## 2024-02-02 PROCEDURE — 99214 OFFICE O/P EST MOD 30 MIN: CPT | Mod: S$PBB,,, | Performed by: NURSE PRACTITIONER

## 2024-02-02 PROCEDURE — 99999 PR PBB SHADOW E&M-EST. PATIENT-LVL IV: CPT | Mod: PBBFAC,,, | Performed by: NURSE PRACTITIONER

## 2024-02-02 PROCEDURE — 99214 OFFICE O/P EST MOD 30 MIN: CPT | Mod: PBBFAC,PN | Performed by: NURSE PRACTITIONER

## 2024-02-02 RX ORDER — LEVOTHYROXINE SODIUM 150 UG/1
TABLET ORAL
Qty: 90 TABLET | Refills: 1
Start: 2024-02-02 | End: 2024-03-08 | Stop reason: SDUPTHER

## 2024-02-02 NOTE — PROGRESS NOTES
Subjective:       Patient ID: Halley Mariee is a 38 y.o. female.    Chief Complaint: Annual Exam    Patient presents today following up on thyroid and anxiety. She was seen for a wellness visit and thyroid labs were completed about a month ago. She recently had a baby. She has been taking 150 mcg of synthroid every day with a extra tablet being taken on Sundays equalling 300 mcg dose on one day of the week. T4 is normal and TSH is too low. Patient will be advised on dose adjustment today.  Patient is a normal weight for height.       Thyroid Problem  Presents for follow-up visit. Symptoms include anxiety and depressed mood. Patient reports no cold intolerance, diaphoresis, diarrhea, dry skin, fatigue, heat intolerance, menstrual problem, nail problem, palpitations or visual change. The symptoms have been stable.   Anxiety  Presents for follow-up visit. Symptoms include depressed mood, excessive worry, irritability and nervous/anxious behavior. Patient reports no chest pain, confusion, nausea, palpitations, shortness of breath or suicidal ideas. Symptoms occur occasionally. The severity of symptoms is mild. The quality of sleep is fair. Nighttime awakenings: occasional.         Review of Systems   Constitutional:  Positive for irritability. Negative for activity change, appetite change, diaphoresis, fatigue and fever.   HENT:  Negative for congestion, ear discharge, ear pain, sore throat, trouble swallowing and voice change.    Eyes:  Negative for photophobia, pain, discharge and visual disturbance.   Respiratory:  Negative for cough, chest tightness and shortness of breath.    Cardiovascular:  Negative for chest pain and palpitations.   Gastrointestinal:  Negative for abdominal pain, diarrhea, nausea and vomiting.   Endocrine: Negative for cold intolerance and heat intolerance.        Hypothyroid     Genitourinary:  Negative for difficulty urinating, dysuria and menstrual problem.   Musculoskeletal:  Negative for  arthralgias and gait problem.   Skin:  Negative for rash.   Allergic/Immunologic: Negative for immunocompromised state.   Neurological:  Negative for speech difficulty and headaches.   Psychiatric/Behavioral:  Negative for confusion, self-injury and suicidal ideas. The patient is nervous/anxious.        Past Medical History:   Diagnosis Date    Asthma     Depression affecting pregnancy, postpartum     Graves disease       Past Surgical History:   Procedure Laterality Date    LAPAROSCOPIC APPENDECTOMY Right        Family History   Problem Relation Age of Onset    Hypertension Mother     Diabetes Mother     Hypothyroidism Father     Vitiligo Father     Hashimoto's thyroiditis Sister     Asthma Sister     Epilepsy Brother     Asthma Daughter         1 daugther    Hypertension Maternal Grandmother     Alzheimer's disease Maternal Grandmother     Hyperlipidemia Maternal Grandmother     Hypertension Maternal Grandfather         passed away lung cancer    Cirrhosis Paternal Grandmother        Social History     Socioeconomic History    Marital status:     Number of children: 4   Tobacco Use    Smoking status: Never    Smokeless tobacco: Never   Substance and Sexual Activity    Alcohol use: Not Currently    Drug use: Not Currently    Sexual activity: Yes     Partners: Male       Current Outpatient Medications   Medication Sig Dispense Refill    albuterol (PROVENTIL/VENTOLIN HFA) 90 mcg/actuation inhaler Inhale 2 puffs into the lungs every 6 (six) hours as needed for Wheezing or Shortness of Breath. 18 g 5    CALCIUM-MAGNESIUM-VITAMIN D2 ORAL Take by mouth.      cetirizine 10 mg Cap Take by mouth.      fluticasone-salmeterol diskus inhaler 100-50 mcg Inhale 1 puff into the lungs 2 (two) times daily. Controller 60 each 5    ibuprofen (ADVIL,MOTRIN) 800 MG tablet Take 1 tablet (800 mg total) by mouth every 6 (six) hours as needed (cramping). 60 tablet 0    montelukast (SINGULAIR) 10 mg tablet Take 1 tablet by mouth once  "daily.      multivitamin-Ca-iron-minerals 18-0.4 mg Tab Take by mouth.      prenatal vit no.124/iron/folic (PRENATAL VITAMIN ORAL) Take by mouth Daily.      sertraline (ZOLOFT) 50 MG tablet Take 1 tablet (50 mg total) by mouth once daily. 90 tablet 1    SYNTHROID 150 mcg tablet Brand Only. Take 1 tablets 150 mcg by mouth daily. 90 tablet 1     No current facility-administered medications for this visit.       Review of patient's allergies indicates:   Allergen Reactions    Animal dander Itching, Rash and Shortness Of Breath    Bee pollen      Wheezing   Trigger asthma    Chaste tree extract      Wheezing   Trigger asthma    Grass pollen-red top, standard      Wheezing   Trigger asthma    Mite extract      Wheezing   Trigger asthma    Mold      Wheezing   Trigger asthma     Objective:      Blood pressure 100/60, pulse 63, temperature 98.2 °F (36.8 °C), height 5' 9" (1.753 m), weight 63 kg (139 lb), SpO2 98 %, currently breastfeeding. Body mass index is 20.53 kg/m².   Physical Exam  Vitals and nursing note reviewed.   Constitutional:       General: She is not in acute distress.     Appearance: Normal appearance. She is well-developed.   HENT:      Head: Normocephalic and atraumatic.      Right Ear: External ear normal.      Left Ear: External ear normal.      Nose: Nose normal.      Mouth/Throat:      Mouth: Mucous membranes are moist.   Eyes:      General: Lids are normal. Lids are everted, no foreign bodies appreciated.      Conjunctiva/sclera: Conjunctivae normal.      Pupils: Pupils are equal, round, and reactive to light.      Right eye: Pupil is round and reactive.      Left eye: Pupil is round and reactive.   Neck:      Trachea: Trachea normal.   Cardiovascular:      Rate and Rhythm: Normal rate and regular rhythm.      Pulses: Normal pulses.      Heart sounds: Normal heart sounds, S1 normal and S2 normal.   Pulmonary:      Effort: Pulmonary effort is normal. No respiratory distress.      Breath sounds: Normal " breath sounds.   Abdominal:      General: Abdomen is flat. Bowel sounds are normal.      Palpations: Abdomen is soft. Abdomen is not rigid.      Tenderness: There is no guarding.   Musculoskeletal:         General: Normal range of motion.      Cervical back: Normal range of motion and neck supple. No muscular tenderness.   Lymphadenopathy:      Cervical: No cervical adenopathy.   Skin:     General: Skin is warm and dry.      Capillary Refill: Capillary refill takes less than 2 seconds.   Neurological:      General: No focal deficit present.      Mental Status: She is alert and oriented to person, place, and time. Mental status is at baseline.   Psychiatric:         Mood and Affect: Mood is anxious (controlled).         Behavior: Behavior normal. Behavior is cooperative.         Thought Content: Thought content normal.         Judgment: Judgment normal.             Assessment:       1. FABIOLA (generalized anxiety disorder)    2. Acquired hypothyroidism    3. Depressed mood    4. Lipid screening        Plan:       Halley was seen today for annual exam.    Diagnoses and all orders for this visit:    FABIOLA (generalized anxiety disorder)  -     Comprehensive Metabolic Panel; Future  -     CBC Auto Differential; Future  -     Comprehensive Metabolic Panel  -     CBC Auto Differential    Acquired hypothyroidism  -     SYNTHROID 150 mcg tablet; Brand Only. Take 1 tablets 150 mcg by mouth daily.  -     T4, Free; Future  -     TSH; Future  -     T4, Free  -     TSH    Continue 150 mcg dose of synthroid. Take one daily. Do not take the extra dose one day of the week.    Repeat labs in 3 months.    Depressed mood  Improved  Controlled with zoloft    Lipid screening  -     Lipid Panel; Future  -     Lipid Panel

## 2024-02-15 ENCOUNTER — PATIENT MESSAGE (OUTPATIENT)
Dept: FAMILY MEDICINE | Facility: CLINIC | Age: 39
End: 2024-02-15
Payer: OTHER GOVERNMENT

## 2024-02-15 DIAGNOSIS — R21 FACIAL RASH: Primary | ICD-10-CM

## 2024-02-20 ENCOUNTER — TELEPHONE (OUTPATIENT)
Dept: DERMATOLOGY | Facility: CLINIC | Age: 39
End: 2024-02-20
Payer: OTHER GOVERNMENT

## 2024-02-21 ENCOUNTER — OFFICE VISIT (OUTPATIENT)
Dept: FAMILY MEDICINE | Facility: CLINIC | Age: 39
End: 2024-02-21
Payer: OTHER GOVERNMENT

## 2024-02-21 DIAGNOSIS — Z78.9 BREASTFEEDING (INFANT): ICD-10-CM

## 2024-02-21 DIAGNOSIS — N30.00 ACUTE CYSTITIS WITHOUT HEMATURIA: Primary | ICD-10-CM

## 2024-02-21 PROCEDURE — 99213 OFFICE O/P EST LOW 20 MIN: CPT | Mod: 95,,,

## 2024-02-21 RX ORDER — AMOXICILLIN AND CLAVULANATE POTASSIUM 500; 125 MG/1; MG/1
1 TABLET, FILM COATED ORAL 2 TIMES DAILY
Qty: 10 TABLET | Refills: 0 | Status: SHIPPED | OUTPATIENT
Start: 2024-02-21 | End: 2024-02-26

## 2024-02-21 NOTE — PROGRESS NOTES
Subjective:       Patient ID: Halley Mariee is a 38 y.o. female.    Chief Complaint: No chief complaint on file.    Virtual visit. UTI symptoms. She is breastfeeding a 4 month old infant currently.     Dysuria   This is a recurrent problem. The current episode started today. The problem occurs every urination. The problem has been unchanged. The quality of the pain is described as burning. The pain is at a severity of 2/10. The patient is experiencing no pain. There has been no fever. The fever has been present for Less than 1 day. She is Sexually active. There is No history of pyelonephritis. Associated symptoms include frequency, urgency and rash. Pertinent negatives include no behavior changes, chills, discharge, flank pain, hematuria, hesitancy, nausea, possible pregnancy, sweats, vomiting, weight loss, constipation or withholding. She has tried increased fluids for the symptoms. The treatment provided no relief. Her past medical history is significant for recurrent UTIs. There is no history of catheterization, diabetes insipidus, diabetes mellitus, genitourinary reflux, hypertension, kidney stones, a single kidney, STD, urinary stasis or a urological procedure.     The patient location is: [place]  The chief complaint leading to consultation is: UTI    Visit type: audiovisual      Face to Face time with patient: 10  20 minutes of total time spent on the encounter, which includes face to face time and non-face to face time preparing to see the patient (eg, review of tests), Obtaining and/or reviewing separately obtained history, Documenting clinical information in the electronic or other health record, Independently interpreting results (not separately reported) and communicating results to the patient/family/caregiver, or Care coordination (not separately reported).     Each patient to whom he or she provides medical services by telemedicine is:  (1) informed of the relationship between the physician and  patient and the respective role of any other health care provider with respect to management of the patient; and (2) notified that he or she may decline to receive medical services by telemedicine and may withdraw from such care at any time.               Past Medical History:   Diagnosis Date    Asthma     Depression affecting pregnancy, postpartum     Graves disease        Review of patient's allergies indicates:   Allergen Reactions    Animal dander Itching, Rash and Shortness Of Breath    Bee pollen      Wheezing   Trigger asthma    Chaste tree extract      Wheezing   Trigger asthma    Grass pollen-red top, standard      Wheezing   Trigger asthma    Mite extract      Wheezing   Trigger asthma    Mold      Wheezing   Trigger asthma         Current Outpatient Medications:     albuterol (PROVENTIL/VENTOLIN HFA) 90 mcg/actuation inhaler, Inhale 2 puffs into the lungs every 6 (six) hours as needed for Wheezing or Shortness of Breath., Disp: 18 g, Rfl: 5    amoxicillin-clavulanate 500-125mg (AUGMENTIN) 500-125 mg Tab, Take 1 tablet (500 mg total) by mouth 2 (two) times daily. for 5 days, Disp: 10 tablet, Rfl: 0    CALCIUM-MAGNESIUM-VITAMIN D2 ORAL, Take by mouth., Disp: , Rfl:     cetirizine 10 mg Cap, Take by mouth., Disp: , Rfl:     fluticasone-salmeterol diskus inhaler 100-50 mcg, Inhale 1 puff into the lungs 2 (two) times daily. Controller, Disp: 60 each, Rfl: 5    ibuprofen (ADVIL,MOTRIN) 800 MG tablet, Take 1 tablet (800 mg total) by mouth every 6 (six) hours as needed (cramping)., Disp: 60 tablet, Rfl: 0    montelukast (SINGULAIR) 10 mg tablet, Take 1 tablet by mouth once daily., Disp: , Rfl:     multivitamin-Ca-iron-minerals 18-0.4 mg Tab, Take by mouth., Disp: , Rfl:     prenatal vit no.124/iron/folic (PRENATAL VITAMIN ORAL), Take by mouth Daily., Disp: , Rfl:     sertraline (ZOLOFT) 50 MG tablet, Take 1 tablet (50 mg total) by mouth once daily., Disp: 90 tablet, Rfl: 1    SYNTHROID 150 mcg tablet, Brand  Only. Take 1 tablets 150 mcg by mouth daily., Disp: 90 tablet, Rfl: 1    Review of Systems   Constitutional:  Negative for chills and weight loss.   Gastrointestinal:  Negative for constipation, nausea and vomiting.   Genitourinary:  Positive for dysuria, frequency and urgency. Negative for flank pain, hematuria and hesitancy.   Skin:  Positive for rash.       Objective:      There were no vitals taken for this visit.  Physical Exam  Constitutional:       General: She is not in acute distress.     Appearance: Normal appearance. She is not ill-appearing or toxic-appearing.      Comments: PE limited    Pulmonary:      Effort: Pulmonary effort is normal. No respiratory distress.   Neurological:      Mental Status: She is alert.   Psychiatric:         Mood and Affect: Mood normal.         Behavior: Behavior normal.         Thought Content: Thought content normal.         Judgment: Judgment normal.         Assessment:       1. Acute cystitis without hematuria    2. Breastfeeding (infant)        Plan:       Acute cystitis without hematuria  -     amoxicillin-clavulanate 500-125mg (AUGMENTIN) 500-125 mg Tab; Take 1 tablet (500 mg total) by mouth 2 (two) times daily. for 5 days  Dispense: 10 tablet; Refill: 0        -     Instructed to contact clinic if symptoms do not resolve     Breastfeeding (infant)                 Chris Love PA-C  Family Medicine Physician Assistant       Future Appointments       Date Provider Specialty Appt Notes    4/18/2024 Indigo Oates MD Dermatology rash    5/6/2024 Keisha Shaw FNP-C Family Medicine 3 mo f/u thyroid / lab review    7/11/2024 Keisha Shaw FNP-C Family Medicine 6 mo f/u               I spent a total of 20 minutes on the day of the visit.This includes face to face time and non-face to face time preparing to see the patient (eg, review of tests), obtaining and/or reviewing separately obtained history, documenting clinical information in the electronic or  other health record, independently interpreting results and communicating results to the patient/family/caregiver, or care coordinator.      We have addressed [3] Low: 2 or more self-limited or minor problems / 1 stable chronic illness / 1 acute, uncomplicated illness or injury  The complexity of the data reviewed and analyzed for this visit was [2] Minimal or None  The risk of complications and/or morbidity or mortality are [4] Moderate risk (I.e. prescription drug management / decision regarding minor surgery with identified pt or procedure risk factors / decision regarding elective major surgery without identified pt or procedure risk factors / diagnosis or treatment significantly limited by social determinants of health)   The level of Medical Decision Making for this visit is [3] Low

## 2024-03-08 DIAGNOSIS — E03.9 ACQUIRED HYPOTHYROIDISM: ICD-10-CM

## 2024-03-11 RX ORDER — LEVOTHYROXINE SODIUM 150 UG/1
TABLET ORAL
Qty: 90 TABLET | Refills: 1
Start: 2024-03-11 | End: 2024-05-15

## 2024-03-30 ENCOUNTER — OFFICE VISIT (OUTPATIENT)
Dept: URGENT CARE | Facility: CLINIC | Age: 39
End: 2024-03-30
Payer: OTHER GOVERNMENT

## 2024-03-30 VITALS
HEART RATE: 80 BPM | OXYGEN SATURATION: 97 % | TEMPERATURE: 98 F | DIASTOLIC BLOOD PRESSURE: 63 MMHG | RESPIRATION RATE: 20 BRPM | BODY MASS INDEX: 19.7 KG/M2 | WEIGHT: 133 LBS | SYSTOLIC BLOOD PRESSURE: 99 MMHG | HEIGHT: 69 IN

## 2024-03-30 DIAGNOSIS — J02.0 STREP PHARYNGITIS: ICD-10-CM

## 2024-03-30 DIAGNOSIS — J02.9 SORE THROAT: Primary | ICD-10-CM

## 2024-03-30 LAB
CTP QC/QA: YES
S PYO RRNA THROAT QL PROBE: POSITIVE

## 2024-03-30 PROCEDURE — 99204 OFFICE O/P NEW MOD 45 MIN: CPT | Mod: S$GLB,,,

## 2024-03-30 PROCEDURE — 87880 STREP A ASSAY W/OPTIC: CPT | Mod: QW,,,

## 2024-03-30 RX ORDER — AMOXICILLIN 500 MG/1
500 TABLET, FILM COATED ORAL EVERY 12 HOURS
Qty: 20 TABLET | Refills: 0 | Status: SHIPPED | OUTPATIENT
Start: 2024-03-30 | End: 2024-04-09

## 2024-03-30 NOTE — PROGRESS NOTES
"Subjective:      Patient ID: Halley Mariee is a 38 y.o. female.    Vitals:  height is 5' 9" (1.753 m) and weight is 60.3 kg (133 lb). Her temperature is 98.4 °F (36.9 °C). Her blood pressure is 99/63 and her pulse is 80. Her respiration is 20 and oxygen saturation is 97%.     Chief Complaint: Sore Throat (Headache, body aches, chills. Only left tonsil hurts. - Entered by patient)    Pt states " c/o sore throat,  chills that has been going on for 3 days. Took motrin." Anorexia.  Majority of the kids in the house also with a cough.  One child with fever.    Sore Throat   This is a new problem. The current episode started in the past 7 days. The problem has been unchanged. Maximum temperature: On measured. Associated symptoms include a hoarse voice and trouble swallowing. She has tried NSAIDs for the symptoms.       Constitution: Positive for chills, sweating and fever (Subjective).   HENT:  Positive for sore throat and trouble swallowing.    Neck: Positive for painful lymph nodes.   Cardiovascular: Negative.    Eyes: Negative.    Respiratory: Negative.     Gastrointestinal: Negative.    Endocrine: negative.   Genitourinary: Negative.    Musculoskeletal: Negative.    Skin: Negative.    Allergic/Immunologic: Positive for immunizations up-to-date.   Hematologic/Lymphatic: Positive for swollen lymph nodes.   Psychiatric/Behavioral: Negative.        Objective:     Physical Exam   Constitutional: She is oriented to person, place, and time. She appears well-developed. She is cooperative.   HENT:   Head: Normocephalic and atraumatic.   Ears:   Right Ear: Hearing, tympanic membrane, external ear and ear canal normal.   Left Ear: Hearing, tympanic membrane, external ear and ear canal normal.   Nose: Nose normal. No mucosal edema or nasal deformity. No epistaxis. Right sinus exhibits no maxillary sinus tenderness and no frontal sinus tenderness. Left sinus exhibits no maxillary sinus tenderness and no frontal sinus tenderness. "   Mouth/Throat: Uvula is midline and mucous membranes are normal. Mucous membranes are moist. No trismus in the jaw. Normal dentition. No uvula swelling. Oropharyngeal exudate and posterior oropharyngeal erythema present. Tonsils are 1+ on the right. Tonsils are 1+ on the left. Tonsillar exudate.   Eyes: Conjunctivae and lids are normal. Pupils are equal, round, and reactive to light. Extraocular movement intact   Neck: Trachea normal and phonation normal. Neck supple.   Cardiovascular: Normal rate, regular rhythm, normal heart sounds and normal pulses.   Pulmonary/Chest: Effort normal and breath sounds normal.   Abdominal: Normal appearance and bowel sounds are normal. Soft.   Musculoskeletal: Normal range of motion.         General: Normal range of motion.   Lymphadenopathy:     She has cervical adenopathy.   Neurological: She is alert and oriented to person, place, and time. She exhibits normal muscle tone.   Skin: Skin is warm, dry and intact.   Psychiatric: Her speech is normal and behavior is normal. Judgment and thought content normal.   Nursing note and vitals reviewed.      Assessment:     1. Sore throat    2. Strep pharyngitis        Plan:       Sore throat  -     POCT rapid strep A    Strep pharyngitis    Other orders  -     amoxicillin (AMOXIL) 500 MG Tab; Take 1 tablet (500 mg total) by mouth every 12 (twelve) hours. for 10 days  Dispense: 20 tablet; Refill: 0

## 2024-04-05 ENCOUNTER — OFFICE VISIT (OUTPATIENT)
Dept: FAMILY MEDICINE | Facility: CLINIC | Age: 39
End: 2024-04-05
Payer: OTHER GOVERNMENT

## 2024-04-05 DIAGNOSIS — J45.21 MILD INTERMITTENT ASTHMA WITH ACUTE EXACERBATION: ICD-10-CM

## 2024-04-05 PROBLEM — O99.513 ASTHMA AFFECTING PREGNANCY IN THIRD TRIMESTER: Status: RESOLVED | Noted: 2023-10-03 | Resolved: 2024-04-05

## 2024-04-05 PROBLEM — J45.909 ASTHMA AFFECTING PREGNANCY IN THIRD TRIMESTER: Status: RESOLVED | Noted: 2023-10-03 | Resolved: 2024-04-05

## 2024-04-05 PROBLEM — O09.523 MULTIGRAVIDA OF ADVANCED MATERNAL AGE IN THIRD TRIMESTER: Status: RESOLVED | Noted: 2023-10-03 | Resolved: 2024-04-05

## 2024-04-05 PROCEDURE — 99213 OFFICE O/P EST LOW 20 MIN: CPT | Mod: 95,,, | Performed by: PHYSICIAN ASSISTANT

## 2024-04-05 RX ORDER — MONTELUKAST SODIUM 10 MG/1
10 TABLET ORAL DAILY
Qty: 90 TABLET | Refills: 3 | Status: SHIPPED | OUTPATIENT
Start: 2024-04-05

## 2024-04-05 RX ORDER — FLUTICASONE PROPIONATE AND SALMETEROL 100; 50 UG/1; UG/1
1 POWDER RESPIRATORY (INHALATION) 2 TIMES DAILY
Qty: 60 EACH | Refills: 5 | Status: SHIPPED | OUTPATIENT
Start: 2024-04-05

## 2024-04-05 RX ORDER — PREDNISONE 10 MG/1
TABLET ORAL
Qty: 12 TABLET | Refills: 0 | Status: SHIPPED | OUTPATIENT
Start: 2024-04-05 | End: 2024-05-15

## 2024-04-05 NOTE — PATIENT INSTRUCTIONS
Bertrand Rowley,     If you are due for any health screening(s) below please notify me so we can arrange them to be ordered and scheduled. Most healthy patients at your age complete them, but you are free to accept or refuse.     If you can't do it, I'll definitely understand. If you can, I'd certainly appreciate it!    Tests to Keep You Healthy    Cervical Cancer Screening: DUE      Your cervical cancer screening is due     Our records indicate that you may be overdue for your screening Pap smear. A Pap smear is an important health screening that can detect abnormal cells that can become cervical cancer. Cervical cancer screenings allow for early diagnosis and increase the likelihood of successful treatment.     The current recommendation for Pap smear screening is every 3-5 years for women at average risk. We encourage you to schedule your appointment with your womens health provider. Many women see a gynecologist for this screening, but some primary care providers also provide Pap screening.     If you recently had your Pap smear screening performed outside of [facility], please let your health care team know so that they can update your health record.

## 2024-04-05 NOTE — PROGRESS NOTES
The patient location is: [place]  The chief complaint leading to consultation is: asthma exacerbation    Visit type: audiovisual    Face to Face time with patient: 15 minutes of total time spent on the encounter, which includes face to face time and non-face to face time preparing to see the patient (eg, review of tests), Obtaining and/or reviewing separately obtained history, Documenting clinical information in the electronic or other health record, Independently interpreting results (not separately reported) and communicating results to the patient/family/caregiver, or Care coordination (not separately reported).         Each patient to whom he or she provides medical services by telemedicine is:  (1) informed of the relationship between the physician and patient and the respective role of any other health care provider with respect to management of the patient; and (2) notified that he or she may decline to receive medical services by telemedicine and may withdraw from such care at any time.    Notes:  Patient presented to urgent care last week with strep throat and is currently on Amoxil.  She states that has resolved but over the last 2 days she has started with an asthma exacerbation.  She gets this occasionally with seasonal allergies.  She has been out of her Advair and Singulair and needs a refill of this.  She has been using albuterol with little relief.  She complains of mild shortness of breath on exertion.  She has not been taking or needing any cough syrup.  She does usually require some prednisone when her asthma does flare up.  She is almost finished her Amoxil.  She denies any fever, chest pain or severe shortness a breath.    Halley was seen today for asthma.    Diagnoses and all orders for this visit:    Mild intermittent asthma with acute exacerbation  -     montelukast (SINGULAIR) 10 mg tablet; Take 1 tablet (10 mg total) by mouth once daily.  -     fluticasone-salmeterol diskus inhaler 100-50  mcg; Inhale 1 puff into the lungs 2 (two) times daily. Controller  -     predniSONE (DELTASONE) 10 MG tablet; 3 pills a day for 2 days, then 2 pills a day for 2 days then 1 pill a day     Call or go to urgent care if symptoms worsen or new symptoms develop.    Answers submitted by the patient for this visit:  Shortness of Breath Questionnaire (Submitted on 4/5/2024)  Chief Complaint: Shortness of breath  Chronicity: recurrent  Onset: yesterday  Frequency: constantly  Progression since onset: unchanged  Episode duration: 24 Days  abdominal pain: No  chest pain: No  claudication: No  coryza: No  ear pain: No  fever: No  headaches: No  hemoptysis: No  leg pain: No  leg swelling: No  neck pain: No  orthopnea: Yes  PND: No  rash: No  rhinorrhea: Yes  sore throat: No  sputum production: Yes  swollen glands: No  syncope: No  vomiting: No  wheezing: Yes  Aggravating factors: smoke, animal exposure, odors, URIs, fumes, pollens  Improvement on treatment: no relief  Risk factors for DVT/PE: no known risk factors  Treatments tried: beta agonist inhalers  asthma: Yes  allergies: Yes  COPD: No  pneumonia: No  aspirin allergies: No  CAD: No  DVT: No  heart failure: No  PE: No  recent surgery: No  bronchiolitis: No  chronic lung disease: No

## 2024-04-18 ENCOUNTER — OFFICE VISIT (OUTPATIENT)
Dept: DERMATOLOGY | Facility: CLINIC | Age: 39
End: 2024-04-18
Payer: OTHER GOVERNMENT

## 2024-04-18 VITALS — WEIGHT: 132.94 LBS | HEIGHT: 69 IN | BODY MASS INDEX: 19.69 KG/M2

## 2024-04-18 DIAGNOSIS — Z76.89 ENCOUNTER FOR SKIN CARE: ICD-10-CM

## 2024-04-18 DIAGNOSIS — D23.9 DERMATOFIBROMA: Primary | ICD-10-CM

## 2024-04-18 DIAGNOSIS — I83.91 SPIDER VEIN OF RIGHT LOWER EXTREMITY: ICD-10-CM

## 2024-04-18 PROCEDURE — 99203 OFFICE O/P NEW LOW 30 MIN: CPT | Mod: AQ,S$GLB,, | Performed by: DERMATOLOGY

## 2024-04-18 NOTE — PROGRESS NOTES
"  Subjective:      Patient ID:  Halley Mariee is a 38 y.o. female who presents for   Chief Complaint   Patient presents with    Spot     Right leg      New Patient    Patient here today for spot to right leg x years.  Pt states spot has changed in color and texture.    Discuss options for face care routine, "face gets really dry"  Currently uses sensitive skin wash- toleriane, toner from PAL, HA serum then clinic lotion  Has niacinamide serum, uses occasionally  AM PAL face oil, skinceuticals CE ferrulic, HA serum, clinic lotion or moisture rescue      Derm Hx:  Denies Phx of NMSC  Fhx of MM-Maternal Grandmother    10 years of tanning bed use in youth.    Current Outpatient Medications:   ·  albuterol (PROVENTIL/VENTOLIN HFA) 90 mcg/actuation inhaler, Inhale 2 puffs into the lungs every 6 (six) hours as needed for Wheezing or Shortness of Breath., Disp: 18 g, Rfl: 5  ·  CALCIUM-MAGNESIUM-VITAMIN D2 ORAL, Take by mouth., Disp: , Rfl:   ·  cetirizine 10 mg Cap, Take by mouth., Disp: , Rfl:   ·  fluticasone-salmeterol diskus inhaler 100-50 mcg, Inhale 1 puff into the lungs 2 (two) times daily. Controller, Disp: 60 each, Rfl: 5  ·  ibuprofen (ADVIL,MOTRIN) 800 MG tablet, Take 1 tablet (800 mg total) by mouth every 6 (six) hours as needed (cramping)., Disp: 60 tablet, Rfl: 0  ·  montelukast (SINGULAIR) 10 mg tablet, Take 1 tablet (10 mg total) by mouth once daily., Disp: 90 tablet, Rfl: 3  ·  predniSONE (DELTASONE) 10 MG tablet, 3 pills a day for 2 days, then 2 pills a day for 2 days then 1 pill a day, Disp: 12 tablet, Rfl: 0  ·  prenatal vit no.124/iron/folic (PRENATAL VITAMIN ORAL), Take by mouth Daily., Disp: , Rfl:   ·  sertraline (ZOLOFT) 50 MG tablet, Take 1 tablet (50 mg total) by mouth once daily., Disp: 90 tablet, Rfl: 1  ·  SYNTHROID 150 mcg tablet, Brand Only. Take 1 tablets 150 mcg by mouth daily., Disp: 90 tablet, Rfl: 1        Review of Systems   Constitutional:  Negative for fever, chills and fatigue. "   Skin:  Positive for activity-related sunscreen use. Negative for daily sunscreen use.       Objective:   Physical Exam   Constitutional: She appears well-developed and well-nourished.   Neurological: She is alert and oriented to person, place, and time.   Psychiatric: She has a normal mood and affect.   Skin:               Diagram Legend     Erythematous scaling macule/papule c/w actinic keratosis       Vascular papule c/w angioma      Pigmented verrucoid papule/plaque c/w seborrheic keratosis      Yellow umbilicated papule c/w sebaceous hyperplasia      Irregularly shaped tan macule c/w lentigo     1-2 mm smooth white papules consistent with Milia      Movable subcutaneous cyst with punctum c/w epidermal inclusion cyst      Subcutaneous movable cyst c/w pilar cyst      Firm pink to brown papule c/w dermatofibroma      Pedunculated fleshy papule(s) c/w skin tag(s)      Evenly pigmented macule c/w junctional nevus     Mildly variegated pigmented, slightly irregular-bordered macule c/w mildly atypical nevus      Flesh colored to evenly pigmented papule c/w intradermal nevus       Pink pearly papule/plaque c/w basal cell carcinoma      Erythematous hyperkeratotic cursted plaque c/w SCC      Surgical scar with no sign of skin cancer recurrence      Open and closed comedones      Inflammatory papules and pustules      Verrucoid papule consistent consistent with wart     Erythematous eczematous patches and plaques     Dystrophic onycholytic nail with subungual debris c/w onychomycosis     Umbilicated papule    Erythematous-base heme-crusted tan verrucoid plaque consistent with inflamed seborrheic keratosis     Erythematous Silvery Scaling Plaque c/w Psoriasis     See annotation      Assessment / Plan:        Halley was seen today for spot.    Diagnoses and all orders for this visit:    Dermatofibroma  This is a benign scar-like lesion secondary to minor trauma. No treatment required.     Spider vein of right lower  extremity  Discussed sclerotherapy with Dr Tanner or local cosmetic dermatologist    Skin care routine  Add daily SPF- elta MD daily would provide additional moisture         No follow-ups on file.

## 2024-05-07 LAB
ALBUMIN SERPL-MCNC: 4.7 G/DL (ref 3.9–4.9)
ALBUMIN/GLOB SERPL: 2 {RATIO} (ref 1.2–2.2)
ALP SERPL-CCNC: 121 IU/L (ref 44–121)
ALT SERPL-CCNC: 19 IU/L (ref 0–32)
AST SERPL-CCNC: 18 IU/L (ref 0–40)
BASOPHILS # BLD AUTO: 0 X10E3/UL (ref 0–0.2)
BASOPHILS NFR BLD AUTO: 1 %
BILIRUB SERPL-MCNC: 0.5 MG/DL (ref 0–1.2)
BUN SERPL-MCNC: 12 MG/DL (ref 6–20)
BUN/CREAT SERPL: 19 (ref 9–23)
CALCIUM SERPL-MCNC: 9.5 MG/DL (ref 8.7–10.2)
CHLORIDE SERPL-SCNC: 105 MMOL/L (ref 96–106)
CHOLEST SERPL-MCNC: 165 MG/DL (ref 100–199)
CO2 SERPL-SCNC: 24 MMOL/L (ref 20–29)
CREAT SERPL-MCNC: 0.63 MG/DL (ref 0.57–1)
EOSINOPHIL # BLD AUTO: 0.2 X10E3/UL (ref 0–0.4)
EOSINOPHIL NFR BLD AUTO: 3 %
ERYTHROCYTE [DISTWIDTH] IN BLOOD BY AUTOMATED COUNT: 13 % (ref 11.7–15.4)
EST. GFR  (NO RACE VARIABLE): 116 ML/MIN/1.73
GLOBULIN SER CALC-MCNC: 2.3 G/DL (ref 1.5–4.5)
GLUCOSE SERPL-MCNC: 81 MG/DL (ref 70–99)
HCT VFR BLD AUTO: 41.5 % (ref 34–46.6)
HDLC SERPL-MCNC: 53 MG/DL
HGB BLD-MCNC: 13.9 G/DL (ref 11.1–15.9)
IMM GRANULOCYTES # BLD AUTO: 0 X10E3/UL (ref 0–0.1)
IMM GRANULOCYTES NFR BLD AUTO: 0 %
LDLC SERPL CALC-MCNC: 101 MG/DL (ref 0–99)
LYMPHOCYTES # BLD AUTO: 2.2 X10E3/UL (ref 0.7–3.1)
LYMPHOCYTES NFR BLD AUTO: 42 %
MCH RBC QN AUTO: 31.4 PG (ref 26.6–33)
MCHC RBC AUTO-ENTMCNC: 33.5 G/DL (ref 31.5–35.7)
MCV RBC AUTO: 94 FL (ref 79–97)
MONOCYTES # BLD AUTO: 0.3 X10E3/UL (ref 0.1–0.9)
MONOCYTES NFR BLD AUTO: 6 %
NEUTROPHILS # BLD AUTO: 2.5 X10E3/UL (ref 1.4–7)
NEUTROPHILS NFR BLD AUTO: 48 %
PLATELET # BLD AUTO: 203 X10E3/UL (ref 150–450)
POTASSIUM SERPL-SCNC: 4.1 MMOL/L (ref 3.5–5.2)
PROT SERPL-MCNC: 7 G/DL (ref 6–8.5)
RBC # BLD AUTO: 4.43 X10E6/UL (ref 3.77–5.28)
SODIUM SERPL-SCNC: 143 MMOL/L (ref 134–144)
T4 FREE SERPL-MCNC: 1.39 NG/DL (ref 0.82–1.77)
TRIGL SERPL-MCNC: 57 MG/DL (ref 0–149)
TSH SERPL DL<=0.005 MIU/L-ACNC: 0.03 UIU/ML (ref 0.45–4.5)
VLDLC SERPL CALC-MCNC: 11 MG/DL (ref 5–40)
WBC # BLD AUTO: 5.2 X10E3/UL (ref 3.4–10.8)

## 2024-05-09 ENCOUNTER — ON-DEMAND VIRTUAL (OUTPATIENT)
Dept: URGENT CARE | Facility: CLINIC | Age: 39
End: 2024-05-09
Payer: OTHER GOVERNMENT

## 2024-05-09 DIAGNOSIS — R06.2 WHEEZING: Primary | ICD-10-CM

## 2024-05-09 DIAGNOSIS — R06.02 SOB (SHORTNESS OF BREATH): ICD-10-CM

## 2024-05-09 PROCEDURE — 99213 OFFICE O/P EST LOW 20 MIN: CPT | Mod: 95,,,

## 2024-05-09 NOTE — PROGRESS NOTES
Subjective:      Patient ID: Halley Mariee is a 38 y.o. female at home    Vitals:  vitals were not taken for this visit.     Chief Complaint: Sinus Problem      Visit Type: TELE AUDIOVISUAL    Present with the patient at the time of consultation: TELEMED PRESENT WITH PATIENT: None    Past Medical History:   Diagnosis Date    Asthma     Depression affecting pregnancy, postpartum     Graves disease      Past Surgical History:   Procedure Laterality Date    APPENDECTOMY      LAPAROSCOPIC APPENDECTOMY Right      Review of patient's allergies indicates:   Allergen Reactions    Animal dander Itching, Rash and Shortness Of Breath    Bee pollen      Wheezing   Trigger asthma    Chaste tree extract      Wheezing   Trigger asthma    Grass pollen-red top, standard      Wheezing   Trigger asthma    Mite extract      Wheezing   Trigger asthma    Mold      Wheezing   Trigger asthma     Current Outpatient Medications on File Prior to Visit   Medication Sig Dispense Refill    albuterol (PROVENTIL/VENTOLIN HFA) 90 mcg/actuation inhaler Inhale 2 puffs into the lungs every 6 (six) hours as needed for Wheezing or Shortness of Breath. 18 g 5    CALCIUM-MAGNESIUM-VITAMIN D2 ORAL Take by mouth.      cetirizine 10 mg Cap Take by mouth.      fluticasone-salmeterol diskus inhaler 100-50 mcg Inhale 1 puff into the lungs 2 (two) times daily. Controller 60 each 5    ibuprofen (ADVIL,MOTRIN) 800 MG tablet Take 1 tablet (800 mg total) by mouth every 6 (six) hours as needed (cramping). 60 tablet 0    montelukast (SINGULAIR) 10 mg tablet Take 1 tablet (10 mg total) by mouth once daily. 90 tablet 3    predniSONE (DELTASONE) 10 MG tablet 3 pills a day for 2 days, then 2 pills a day for 2 days then 1 pill a day 12 tablet 0    prenatal vit no.124/iron/folic (PRENATAL VITAMIN ORAL) Take by mouth Daily.      sertraline (ZOLOFT) 50 MG tablet Take 1 tablet (50 mg total) by mouth once daily. 90 tablet 1    SYNTHROID 150 mcg tablet Brand Only. Take 1  tablets 150 mcg by mouth daily. 90 tablet 1     No current facility-administered medications on file prior to visit.     Family History   Problem Relation Name Age of Onset    Hypertension Mother Resolved     Diabetes Mother Resolved     Hypothyroidism Father      Vitiligo Father      Hashimoto's thyroiditis Sister Marlin     Asthma Sister Marlin     Depression Sister Marlin     Epilepsy Brother      Hypertension Maternal Grandmother Mawmaw     Alzheimer's disease Maternal Grandmother Mawmaw     Hyperlipidemia Maternal Grandmother Mawmaw     Melanoma Maternal Grandmother Mawmaw     Hypertension Maternal Grandfather Pawpaw         passed away lung cancer    Cancer Maternal Grandfather Pawpaw     Heart disease Maternal Grandfather [place]     Cirrhosis Paternal Grandmother      Asthma Daughter Prim         1 daugther           [facility] P[facility] Odvv Intake    5/9/2024  3:01 PM CDT - Filed by Patient   What is your current physical address in the event of a medical emergency? [address]   Are you able to take your vital signs? Yes   Systolic Blood Pressure: 98   Diastolic Blood Pressure: 54   Weight: 132   Height: 68   Pulse: 87   Temperature: 98   Respiration rate: 12   Pulse Oxygen: 98   Please attach any relevant images or files          Patient states that for the past 8 days she has been having congestion, cough, sob and wheezing. Patient states that she is not having any other symptoms at this time. Patient states that her cough is productive but states that she is not sure of the color. Patient states that her nasal drainage is clear/light yellow in color. Patient states that she is not having any chest pain at this time. Patient states that she is staying SOB and has been using her inhaler but states that it is not helping her patient states that she is constantly feeling like she can not catch her breath. Patient states that she has been  using her inhaler but states continuously but states that it is not helping  her and she is continuing to feel as tough she can not catch her breath.          Constitution: Negative.   HENT:  Positive for congestion and postnasal drip.    Neck: neck negative.   Cardiovascular: Negative.    Eyes: Negative.    Respiratory:  Positive for cough, sputum production, shortness of breath and wheezing.    Gastrointestinal: Negative.    Endocrine: negative.   Genitourinary: Negative.    Musculoskeletal: Negative.    Skin: Negative.    Allergic/Immunologic: Negative.    Neurological: Negative.    Hematologic/Lymphatic: Negative.    Psychiatric/Behavioral: Negative.          Objective:   The physical exam was conducted virtually.  Physical Exam   Constitutional: She is oriented to person, place, and time.   HENT:   Head: Normocephalic and atraumatic.   Nose: Congestion present.   Eyes: Conjunctivae are normal. Pupils are equal, round, and reactive to light. Extraocular movement intact   Neck: Neck supple.   Pulmonary/Chest: Effort normal.   Abdominal: Normal appearance.   Musculoskeletal: Normal range of motion.         General: Normal range of motion.   Neurological: no focal deficit. She is alert, oriented to person, place, and time and at baseline.   Skin: Skin is warm.   Psychiatric: Her behavior is normal. Mood, judgment and thought content normal.       Assessment:     1. Wheezing    2. SOB (shortness of breath)        Plan:       Wheezing    SOB (shortness of breath)              Advised patient due to SOB and feeling as tough she can not catch her breath despite using inhaler to be seen in person for further evaluation and treatment.

## 2024-05-15 ENCOUNTER — OFFICE VISIT (OUTPATIENT)
Dept: FAMILY MEDICINE | Facility: CLINIC | Age: 39
End: 2024-05-15
Payer: OTHER GOVERNMENT

## 2024-05-15 VITALS
OXYGEN SATURATION: 98 % | TEMPERATURE: 99 F | DIASTOLIC BLOOD PRESSURE: 60 MMHG | SYSTOLIC BLOOD PRESSURE: 106 MMHG | BODY MASS INDEX: 19.55 KG/M2 | HEART RATE: 60 BPM | HEIGHT: 69 IN | WEIGHT: 132 LBS

## 2024-05-15 DIAGNOSIS — N81.89 WEAKNESS OF PELVIC FLOOR: ICD-10-CM

## 2024-05-15 DIAGNOSIS — E03.9 ACQUIRED HYPOTHYROIDISM: Primary | ICD-10-CM

## 2024-05-15 PROCEDURE — 99213 OFFICE O/P EST LOW 20 MIN: CPT | Mod: S$PBB,,, | Performed by: NURSE PRACTITIONER

## 2024-05-15 PROCEDURE — 99214 OFFICE O/P EST MOD 30 MIN: CPT | Mod: PBBFAC,PN | Performed by: NURSE PRACTITIONER

## 2024-05-15 PROCEDURE — 99999 PR PBB SHADOW E&M-EST. PATIENT-LVL IV: CPT | Mod: PBBFAC,,, | Performed by: NURSE PRACTITIONER

## 2024-05-15 RX ORDER — LEVOTHYROXINE SODIUM 137 UG/1
137 TABLET ORAL
Qty: 90 TABLET | Refills: 1 | Status: SHIPPED | OUTPATIENT
Start: 2024-05-15

## 2024-05-15 NOTE — PROGRESS NOTES
Subjective:       Patient ID: Halley Mariee is a 38 y.o. female.    Chief Complaint: Annual Exam and Follow-up    Patient presents today following up on thyroid and anxiety. She was seen for a wellness visit and thyroid labs were completed about a month ago. She recently had a baby. At her last visit she had been taking 150 mcg of synthroid every day with a extra tablet being taken on Sundays equalling 300 mcg dose on one day of the week. She has been taking 150 mcg every day without an extra dose for the last 3 months. TSH is still too low and she will be advised to reduce the dose to 137 mcg.   Patient is a normal weight for height. She is currently breastfeeding.  She voices concern over a weak pelvic floor postpartum. Patient is interested in pelvic floor therapy to help with this.      Thyroid Problem  Presents for follow-up visit. Symptoms include anxiety and depressed mood. Patient reports no cold intolerance, diaphoresis, diarrhea, dry skin, fatigue, heat intolerance, menstrual problem, nail problem, palpitations or visual change. The symptoms have been improving.   Anxiety  Presents for follow-up visit. Symptoms include depressed mood, excessive worry, irritability and nervous/anxious behavior. Patient reports no chest pain, confusion, nausea, palpitations, shortness of breath or suicidal ideas. Symptoms occur occasionally. The severity of symptoms is mild. The quality of sleep is fair. Nighttime awakenings: occasional.         Review of Systems   Constitutional:  Positive for irritability. Negative for activity change, appetite change, diaphoresis, fatigue and fever.   HENT:  Negative for congestion, ear discharge, ear pain, sore throat, trouble swallowing and voice change.    Eyes:  Negative for photophobia, pain, discharge and visual disturbance.   Respiratory:  Negative for cough, chest tightness and shortness of breath.    Cardiovascular:  Negative for chest pain and palpitations.   Gastrointestinal:   Negative for abdominal pain, diarrhea, nausea and vomiting.   Endocrine: Negative for cold intolerance and heat intolerance.        Hypothyroid     Genitourinary:  Negative for difficulty urinating, dysuria and menstrual problem.        Postpartum weak pelvic floor   Musculoskeletal:  Negative for arthralgias and gait problem.   Skin:  Negative for rash.   Allergic/Immunologic: Negative for immunocompromised state.   Neurological:  Negative for speech difficulty and headaches.   Psychiatric/Behavioral:  Negative for confusion, self-injury and suicidal ideas. The patient is nervous/anxious.        Past Medical History:   Diagnosis Date    Asthma     Depression affecting pregnancy, postpartum     Graves disease       Past Surgical History:   Procedure Laterality Date    APPENDECTOMY      LAPAROSCOPIC APPENDECTOMY Right        Family History   Problem Relation Name Age of Onset    Hypertension Mother Resolved     Diabetes Mother Resolved     Hypothyroidism Father      Vitiligo Father      Hashimoto's thyroiditis Sister Marlin     Asthma Sister Marlin     Depression Sister Marlin     Epilepsy Brother      Hypertension Maternal Grandmother Mawmaw     Alzheimer's disease Maternal Grandmother Mawmaw     Hyperlipidemia Maternal Grandmother Mawmaw     Melanoma Maternal Grandmother Mawmaw     Hypertension Maternal Grandfather Pawpaw         passed away lung cancer    Cancer Maternal Grandfather [place]     Heart disease Maternal Grandfather [place]     Cirrhosis Paternal Grandmother      Asthma Daughter Prim         1 daugther       Social History     Socioeconomic History    Marital status:     Number of children: 4   Tobacco Use    Smoking status: Never    Smokeless tobacco: Never   Substance and Sexual Activity    Alcohol use: Not Currently    Drug use: Not Currently    Sexual activity: Yes     Partners: Male     Social Determinants of Health     Financial Resource Strain: Low Risk  (2/21/2024)    Overall Financial  Resource Strain (CARDIA)     Difficulty of Paying Living Expenses: Not hard at all   Food Insecurity: No Food Insecurity (2/21/2024)    Hunger Vital Sign     Worried About Running Out of Food in the Last Year: Never true     Ran Out of Food in the Last Year: Never true   Transportation Needs: No Transportation Needs (2/21/2024)    PRAPARE - Transportation     Lack of Transportation (Medical): No     Lack of Transportation (Non-Medical): No   Physical Activity: Insufficiently Active (2/21/2024)    Exercise Vital Sign     Days of Exercise per Week: 3 days     Minutes of Exercise per Session: 10 min   Stress: No Stress Concern Present (2/21/2024)    [employer] - Occupational Stress Questionnaire     Feeling of Stress : Not at all   Housing Stability: Low Risk  (2/21/2024)    Housing Stability Vital Sign     Unable to Pay for Housing in the Last Year: No     Number of Places Lived in the Last Year: 2     Unstable Housing in the Last Year: No       Current Outpatient Medications   Medication Sig Dispense Refill    albuterol (PROVENTIL/VENTOLIN HFA) 90 mcg/actuation inhaler Inhale 2 puffs into the lungs every 6 (six) hours as needed for Wheezing or Shortness of Breath. 18 g 5    CALCIUM-MAGNESIUM-VITAMIN D2 ORAL Take by mouth.      cetirizine 10 mg Cap Take by mouth.      fluticasone-salmeterol diskus inhaler 100-50 mcg Inhale 1 puff into the lungs 2 (two) times daily. Controller 60 each 5    montelukast (SINGULAIR) 10 mg tablet Take 1 tablet (10 mg total) by mouth once daily. 90 tablet 3    prenatal vit no.124/iron/folic (PRENATAL VITAMIN ORAL) Take by mouth Daily.      sertraline (ZOLOFT) 50 MG tablet Take 1 tablet (50 mg total) by mouth once daily. 90 tablet 1    SYNTHROID 137 mcg Tab tablet Take 1 tablet (137 mcg total) by mouth before breakfast. 90 tablet 1     No current facility-administered medications for this visit.       Review of patient's allergies indicates:   Allergen Reactions  "   Animal dander Itching, Rash and Shortness Of Breath    Bee pollen      Wheezing   Trigger asthma    Chaste tree extract      Wheezing   Trigger asthma    Grass pollen-red top, standard      Wheezing   Trigger asthma    Mite extract      Wheezing   Trigger asthma    Mold      Wheezing   Trigger asthma     Objective:      Blood pressure 106/60, pulse 60, temperature 98.5 °F (36.9 °C), height 5' 9" (1.753 m), weight 59.9 kg (132 lb), SpO2 98%, unknown if currently breastfeeding. Body mass index is 19.49 kg/m².   Physical Exam  Vitals and nursing note reviewed.   Constitutional:       General: She is not in acute distress.     Appearance: Normal appearance. She is well-developed.   HENT:      Head: Normocephalic and atraumatic.      Right Ear: External ear normal.      Left Ear: External ear normal.      Nose: Nose normal.      Mouth/Throat:      Mouth: Mucous membranes are moist.   Eyes:      General: Lids are normal. Lids are everted, no foreign bodies appreciated.      Conjunctiva/sclera: Conjunctivae normal.      Pupils: Pupils are equal, round, and reactive to light.      Right eye: Pupil is round and reactive.      Left eye: Pupil is round and reactive.   Neck:      Trachea: Trachea normal.   Cardiovascular:      Rate and Rhythm: Normal rate and regular rhythm.      Pulses: Normal pulses.      Heart sounds: Normal heart sounds, S1 normal and S2 normal.   Pulmonary:      Effort: Pulmonary effort is normal. No respiratory distress.      Breath sounds: Normal breath sounds.   Abdominal:      General: Abdomen is flat. Bowel sounds are normal.      Palpations: Abdomen is soft. Abdomen is not rigid.      Tenderness: There is no abdominal tenderness. There is no guarding.   Musculoskeletal:         General: Normal range of motion.      Cervical back: Normal range of motion and neck supple. No muscular tenderness.   Lymphadenopathy:      Cervical: No cervical adenopathy.   Skin:     General: Skin is warm and dry.      " Capillary Refill: Capillary refill takes less than 2 seconds.   Neurological:      General: No focal deficit present.      Mental Status: She is alert and oriented to person, place, and time. Mental status is at baseline.   Psychiatric:         Mood and Affect: Mood is anxious (controlled).         Behavior: Behavior normal. Behavior is cooperative.         Thought Content: Thought content normal.         Judgment: Judgment normal.             Assessment:       1. Acquired hypothyroidism    2. Weakness of pelvic floor        Plan:       Halley was seen today for annual exam and follow-up.    Diagnoses and all orders for this visit:    Acquired hypothyroidism  -     SYNTHROID 137 mcg Tab tablet; Take 1 tablet (137 mcg total) by mouth before breakfast.  -     T3, Free; Future  -     T4, Free; Future  -     TSH; Future  -     T3, Free  -     T4, Free  -     TSH    Weakness of pelvic floor  -     Ambulatory referral/consult to Physical/Occupational Therapy; Future              Follow up in 3 month with labs prior to office visit for chronic condition management since change of synthroid from 150 mcg daily to 137 mcg daily

## 2024-05-26 ENCOUNTER — PATIENT MESSAGE (OUTPATIENT)
Dept: FAMILY MEDICINE | Facility: CLINIC | Age: 39
End: 2024-05-26
Payer: OTHER GOVERNMENT

## 2024-05-26 DIAGNOSIS — E03.9 ACQUIRED HYPOTHYROIDISM: Primary | ICD-10-CM

## 2024-06-05 ENCOUNTER — PATIENT MESSAGE (OUTPATIENT)
Dept: DERMATOLOGY | Facility: CLINIC | Age: 39
End: 2024-06-05
Payer: OTHER GOVERNMENT

## 2024-07-02 ENCOUNTER — CLINICAL SUPPORT (OUTPATIENT)
Dept: REHABILITATION | Facility: HOSPITAL | Age: 39
End: 2024-07-02
Attending: NURSE PRACTITIONER
Payer: OTHER GOVERNMENT

## 2024-07-02 DIAGNOSIS — M62.89 PELVIC FLOOR DYSFUNCTION: Primary | ICD-10-CM

## 2024-07-02 DIAGNOSIS — M62.81 MUSCLE WEAKNESS: ICD-10-CM

## 2024-07-02 DIAGNOSIS — N81.89 WEAKNESS OF PELVIC FLOOR: ICD-10-CM

## 2024-07-02 DIAGNOSIS — M62.08 DIASTASIS RECTI: ICD-10-CM

## 2024-07-02 PROCEDURE — 97162 PT EVAL MOD COMPLEX 30 MIN: CPT | Mod: PO

## 2024-07-02 PROCEDURE — 97530 THERAPEUTIC ACTIVITIES: CPT | Mod: PO

## 2024-07-02 NOTE — PATIENT INSTRUCTIONS
"Home Exercise Program: 07/02/2024    DIAPHRAGMATIC BREATHING     The diaphragm is a dome shaped muscle that forms the floor of the rib cage. It is the most efficient muscle for breathing and relaxation, although most people are not used to using the diaphragm. Diaphragmatic or belly breathing is an important technique to learn because it helps settle down or relax the autonomic nervous system. The correct use of diaphragmatic breathing can help to quiet brain activity resulting in the relaxation of all the muscles and organs of the body. This is accomplished by slow rhythmic breathing concentrated in the diaphragm muscle rather than the chest.    How to do proper relaxation breathing:    Relax your jaw by placing your tongue on the floor of your mouth and keeping your teeth slightly apart.   Take a deep breath in, letting the LOWER RIBS EXPAND OUTWARD while keeping shoulders and chest relax. Your abdomen will also expand but we want to really focus on getting rib expansion versus focusing solely on belly breathing. You can place your hands on the sides of your lower ribs and give a little squeeze to help if you're having trouble.     Exhale should be relaxed and passive.   It doesn't matter if you breathe in/out through your nose and/or mouth. Do whichever feels comfortable.  Remember to breathe slowly.  Do not force your breathing. Do not hold your breath.  Repeat daily while doing stretches listed below:         "single knee to chest" - lay on your back, use your hands to pull your left knee to your chest, keeping the right leg straight on the bed. Hold this pose while you incorporate your diaphragmatic breathing. Repeat on the opposite side. Complete 10-20 breaths on both legs.     __________________________________________________________          "Modified Happy Baby" OPTIONAL VARIATION - lay on your back, use your hands to pull your knees to your chest, then bring them out wide (about even with your " "shoulders). Hold this pose while you incorporate your deep breathing. Complete 10-20 breaths.    __________________________________________________________         "Child's Pose" - first start by getting onto your hands and knees, then move your feet so they are touching and your knees are wider than your hips. Sit back so that you are sitting on your heels and reach your arms forward. Think about resting in this position. Complete 10-20 breaths.     __________________________________________________________    KEGELS - sitting or laying down   1. Lay or Sit comfortably with legs and buttocks relaxed.  2. Contract and LIFT the pelvic floor muscles as if you're trying to stop the stream of urine and passage of gas.   Imagine you have a drawstring that's closing the vaginal opening and lifting your pelvic floor up towards your head   3. Hold LIFT for 5 seconds without holding breath. You should be able to talk out loud the entire time.  4. Release the pelvic muscles right away for 10 seconds rest.  5. Repeat 10 times, 3 sets per day. Spread throughout the day.   No Kegels while urinating!                  "

## 2024-07-02 NOTE — PROGRESS NOTES
[facility]  Pelvic Health Physical Therapy Initial Evaluation    Date: 2024   Name: Halley Grayms  Clinic Number: [id number]  Therapy Diagnosis:   Encounter Diagnoses   Name Primary?    Weakness of pelvic floor     Pelvic floor dysfunction Yes    Muscle weakness     Diastasis recti      Physician: Keisha Shaw FNP-C    Physician Orders: PT Eval and Treat   Medical Diagnosis from Referral: Weakness of pelvic floor [N81.89]   Evaluation Date: 2024  Plan of Care Expiration: 10/2/2024  Visit # / Visits authorized:     FOTO 1 /3 on 2024      Time In: 2:30  Time Out: 3:15  Total Appointment Time (timed & untimed codes): 45 minutes    Precautions:   4 SVDs; 4th baby born in 2023     Subjective     Date of onset: 2023    History of current condition - Halley reports: she's had 4 vaginal deliveries. She's able to stop her urine flow; had issues with that after her 3rd baby but bought a home program and worked on that. After her 4th baby she seems weak in general. Stress urinary incontinence with sneezing, jumping, hard laughing, hard coughing (ex: a cold). Symptoms varies, but sometimes it feels like when she stands up out of the bath it feels like air is coming up into the vagina.     OB/GYN History: , vaginal delivery, episiotomy, and perineal laceration - had episiotomy with first 2, natural tear with last 2.   Sexually active? Yes  Pain with vaginal exams, intercourse or tampon use? No    Pain:  None     Bladder/Bowel History:   Frequency of urination:   Daytime: goes frequently to avoid having a full bladder - estimates she goes every 1 to 2 hrs - varies with fluid intake (patient is currently breastfeeding).            Nighttime: varies; when her daughter wakes up to nurse patient will void before and after nursing. Lately baby has been waking up 1x/night but can be more at time   Difficulty initiating urine stream: No  Urine stream: strong  Complete emptying:  doesn't think so - because she has to go again not long after - also has been told at the OB that her bladder wasn't empty when they did the ultrasound (not sure if that was related to being pregnant or not)  Bladder leakage: Yes - mixed urinary incontinence   Frequency of incidents: few times/week   Amount leaked (urine): varies - few drops to 2 Tbsp.   Urinary Urgency: Yes  Frequency of bowel movements: once/day as long as she has coffee (1-2 cups)   Difficulty initiating BM: No  Quality/Shape of BM: easy to pass   Does Patient Feel Empty after BM? Yes  Fiber Supplements or Laxative Use? No  Form of protection: panty liner  Number of pads required in 24 hours: about 4x/day - could be due to urine or vaginal discharge.        Medical History: Halley  has a past medical history of Asthma, Depression affecting pregnancy, postpartum, and Graves disease.     Surgical History: Halley Mariee  has a past surgical history that includes Laparoscopic appendectomy (Right) and Appendectomy.    Medications: Halley has a current medication list which includes the following prescription(s): albuterol, calcium/magnesium/vitamin d2, cetirizine, fluticasone-salmeterol 100-50 mcg/dose, montelukast, prenatal vit no.124/iron/folic, sertraline, and synthroid.    Allergies:   Review of patient's allergies indicates:   Allergen Reactions    Animal dander Itching, Rash and Shortness Of Breath    Bee pollen      Wheezing   Trigger asthma    Chaste tree extract      Wheezing   Trigger asthma    Grass pollen-red top, standard      Wheezing   Trigger asthma    Mite extract      Wheezing   Trigger asthma    Mold      Wheezing   Trigger asthma          Prior Therapy/Previous treatment included: none for this condition   Social History: lives with  and 4 daughters   Current exercise: recently started - lifts weights (tries to do 2-5x/week 20-45 minutes); still has her pelvic floor/ab rehab program and did a session this morning - has  access to it until December 2024. 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   Prior Level of Function: independent   Current Level of Function: see above       Pts goals: improve PF function, improve core strength     OBJECTIVE     Informed verbal consent provided 7/2/2024 prior to intravaginal exam.  Chaperone: declined         ABDOMINAL WALL ASSESSMENT  Pelvic Floor Muscle and Transverse Abdominus Synergy: present  Diastasis: present - 3 fingers wide at umbilicus; 2 fingers wide above and below; 1 phalanx deep       VAGINAL PELVIC FLOOR EXAM    EXTERNAL ASSESSMENT  Introitus: gaping  Skin condition: redness noted  Scarring: none   Sensation: WNL   Pain: none  Voluntary contraction: visible lift  Voluntary relaxation: visible drop  Involuntary contraction: visible drop  Bearing down: bulge  Perineal descent: absent      INTERNAL ASSESSMENT  Pain: tender areas noted as follows: bilateral levator ani and obturator internus    Sensation: able to localized pressure appropriately   Vaginal vault: roomy (slightly)    Muscle Bulk: WFL   Muscle Power: 2/5    Quality of contraction: decreased hold and decreased lift; excellent relaxation   Specificity: WNL   Coordination: tends to hold breath during PFM contration   Prolapse check: none      Limitation/Restriction for FOTO Pelvic Survey    Therapist reviewed FOTO scores for Halley Mariee on 7/2/2024.   FOTO documents entered into [employer] - see Media section.       TREATMENT     Total Treatment time (time-based codes) separate from Evaluation: 30 minutes      Therapeutic Activity Patient participated in dynamic functional therapeutic activities to improve functional performance for 30 minutes. Including: Education as described below:     Patient Education provided:   general anatomy/physiology of urinary/ bowel  system and benefits of treatment were discussed with  the pt. Additionally, anatomy/physiology of pelvic floor, diaphragmatic breathing, and kegels were reviewed.     Home Exercises provided:  Written Home Exercises provided: yes.  Exercises were reviewed and Halley was able to demonstrate them prior to the end of the session.    Halley demonstrated good  understanding of the education provided.     See EMR under Patient Instructions for exercises provided 7/2/2024.    Assessment     Halley is a 38 y.o. female referred to outpatient Physical Therapy with a medical diagnosis of Weakness of pelvic floor [N81.89] . Pt presents with poor trunk stability, pelvic floor tenderness, decreased pelvic muscle strength, decreased endurance of the pelvic muscles, decreased phasic ability of the pelvic muscles, increased tension of the pelvic muscles, increased frequency of urination, increased nocturia, poor coordination of pelvic floor muscles during ADL's leading to urinary or fecal leakage, dysfunctional voiding, and unable to co-contract or co-relax abdominal wall and pelvic floor muscles.      Pt prognosis is Good.   Pt will benefit from skilled outpatient Physical Therapy to address the deficits stated above and in the chart below, provide pt/family education, and to maximize pt's level of independence.     Plan of care discussed with patient: Yes  Pt's spiritual, cultural and educational needs considered and patient is agreeable to the plan of care and goals as stated below:     Anticipated Barriers for therapy: none    Medical Necessity is demonstrated by the following  History  Co-morbidities and personal factors that may impact the plan of care [] LOW: no personal factors / co-morbidities  [x] MODERATE: 1-2 personal factors / co-morbidities  [] HIGH: 3+ personal factors / co-morbidities    Moderate / High Support Documentation:   Co-morbidities affecting plan of care:  has a past medical history of Asthma, Depression affecting pregnancy, postpartum, and Graves disease.      Personal Factors:   no deficits     Examination  Body Structures and Functions, activity limitations and participation restrictions that may impact the plan of care [] LOW: addressing 1-2 elements  [x] MODERATE: 3+ elements  [] HIGH: 4+ elements (please support below)    Moderate / High Support Documentation: see evaluation     Clinical Presentation [] LOW: stable  [x] MODERATE: Evolving  [] HIGH: Unstable     Decision Making/ Complexity Score: moderate         Goals:  Short Term Goals: 6 weeks   - Pt will demonstrate excellent knowledge and adherence to HEP to facilitate optimal recovery.  - Pt will demonstrate proper PFM contraction, relaxation, and lengthening coordinated with TA and breath for improved muscle coordination needed for functional activity.    Long Term Goals: 12 weeks   - Pt will demonstrate excellent knowledge and adherence to HEP for continued self-maintenance of symptoms.  - Pt will report FOTO score of 10% improvement or more indicating clinically relevant increase in function.  - Pt will report voiding interval of 2-3 hours for improved ADL tolerance.  - Pt will report ability to delay urinary urge for at least 15 minutes to maintain continence with ADL/IADLs.   - Pt will report no incidence of urinary incontinence 6/7 days for improved hygiene and ADL/IADL tolerance.   - Pt will report needing </= 1 liner/day indicating improved PFM function needed to maintain continence  - Pt will demonstrate PFM strength of at least 3/5 MMT for improved strength needed to maintain continence.   - Pt will demonstrate independence with pressure-management strategies to decreased stress on adjacent pelvic structures.       Plan     Plan of care Certification: 7/2/2024 to 10/2/2024.    Outpatient Physical Therapy 1 times weekly for 12 weeks to include the following interventions: therapeutic exercises, therapeutic activity, neuromuscular re-education, manual therapy, modalities PRN, patient/family  education, dry needling, and self care/home management    Anne Rossi, PT, DPT, WCS

## 2024-07-03 ENCOUNTER — PATIENT MESSAGE (OUTPATIENT)
Dept: FAMILY MEDICINE | Facility: CLINIC | Age: 39
End: 2024-07-03
Payer: OTHER GOVERNMENT

## 2024-07-03 PROBLEM — M62.89 PELVIC FLOOR DYSFUNCTION: Status: ACTIVE | Noted: 2024-07-03

## 2024-07-03 PROBLEM — M62.81 MUSCLE WEAKNESS: Status: ACTIVE | Noted: 2024-07-03

## 2024-07-03 PROBLEM — M62.08 DIASTASIS RECTI: Status: ACTIVE | Noted: 2024-07-03

## 2024-07-03 NOTE — PLAN OF CARE
[facility]  Pelvic Health Physical Therapy Initial Evaluation    Date: 2024   Name: Halley Grayms  Clinic Number: [id number]  Therapy Diagnosis:   Encounter Diagnoses   Name Primary?    Weakness of pelvic floor     Pelvic floor dysfunction Yes    Muscle weakness     Diastasis recti      Physician: Keisha Shaw FNP-C    Physician Orders: PT Eval and Treat   Medical Diagnosis from Referral: Weakness of pelvic floor [N81.89]   Evaluation Date: 2024  Plan of Care Expiration: 10/2/2024  Visit # / Visits authorized:     FOTO 1 /3 on 2024      Time In: 2:30  Time Out: 3:15  Total Appointment Time (timed & untimed codes): 45 minutes    Precautions:   4 SVDs; 4th baby born in 2023     Subjective     Date of onset: 2023    History of current condition - Halley reports: she's had 4 vaginal deliveries. She's able to stop her urine flow; had issues with that after her 3rd baby but bought a home program and worked on that. After her 4th baby she seems weak in general. Stress urinary incontinence with sneezing, jumping, hard laughing, hard coughing (ex: a cold). Symptoms varies, but sometimes it feels like when she stands up out of the bath it feels like air is coming up into the vagina.     OB/GYN History: , vaginal delivery, episiotomy, and perineal laceration - had episiotomy with first 2, natural tear with last 2.   Sexually active? Yes  Pain with vaginal exams, intercourse or tampon use? No    Pain:  None     Bladder/Bowel History:   Frequency of urination:   Daytime: goes frequently to avoid having a full bladder - estimates she goes every 1 to 2 hrs - varies with fluid intake (patient is currently breastfeeding).            Nighttime: varies; when her daughter wakes up to nurse patient will void before and after nursing. Lately baby has been waking up 1x/night but can be more at time   Difficulty initiating urine stream: No  Urine stream: strong  Complete emptying:  doesn't think so - because she has to go again not long after - also has been told at the OB that her bladder wasn't empty when they did the ultrasound (not sure if that was related to being pregnant or not)  Bladder leakage: Yes - mixed urinary incontinence   Frequency of incidents: few times/week   Amount leaked (urine): varies - few drops to 2 Tbsp.   Urinary Urgency: Yes  Frequency of bowel movements: once/day as long as she has coffee (1-2 cups)   Difficulty initiating BM: No  Quality/Shape of BM: easy to pass   Does Patient Feel Empty after BM? Yes  Fiber Supplements or Laxative Use? No  Form of protection: panty liner  Number of pads required in 24 hours: about 4x/day - could be due to urine or vaginal discharge.        Medical History: Halley  has a past medical history of Asthma, Depression affecting pregnancy, postpartum, and Graves disease.     Surgical History: Halley Mariee  has a past surgical history that includes Laparoscopic appendectomy (Right) and Appendectomy.    Medications: Halley has a current medication list which includes the following prescription(s): albuterol, calcium/magnesium/vitamin d2, cetirizine, fluticasone-salmeterol 100-50 mcg/dose, montelukast, prenatal vit no.124/iron/folic, sertraline, and synthroid.    Allergies:   Review of patient's allergies indicates:   Allergen Reactions    Animal dander Itching, Rash and Shortness Of Breath    Bee pollen      Wheezing   Trigger asthma    Chaste tree extract      Wheezing   Trigger asthma    Grass pollen-red top, standard      Wheezing   Trigger asthma    Mite extract      Wheezing   Trigger asthma    Mold      Wheezing   Trigger asthma          Prior Therapy/Previous treatment included: none for this condition   Social History: lives with  and 4 daughters   Current exercise: recently started - lifts weights (tries to do 2-5x/week 20-45 minutes); still has her pelvic floor/ab rehab program and did a session this morning - has  access to it until December 2024. 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   Prior Level of Function: independent   Current Level of Function: see above       Pts goals: improve PF function, improve core strength     OBJECTIVE     Informed verbal consent provided 7/2/2024 prior to intravaginal exam.  Chaperone: declined         ABDOMINAL WALL ASSESSMENT  Pelvic Floor Muscle and Transverse Abdominus Synergy: present  Diastasis: present - 3 fingers wide at umbilicus; 2 fingers wide above and below; 1 phalanx deep       VAGINAL PELVIC FLOOR EXAM    EXTERNAL ASSESSMENT  Introitus: gaping  Skin condition: redness noted  Scarring: none   Sensation: WNL   Pain: none  Voluntary contraction: visible lift  Voluntary relaxation: visible drop  Involuntary contraction: visible drop  Bearing down: bulge  Perineal descent: absent      INTERNAL ASSESSMENT  Pain: tender areas noted as follows: bilateral levator ani and obturator internus    Sensation: able to localized pressure appropriately   Vaginal vault: roomy (slightly)    Muscle Bulk: WFL   Muscle Power: 2/5    Quality of contraction: decreased hold and decreased lift; excellent relaxation   Specificity: WNL   Coordination: tends to hold breath during PFM contration   Prolapse check: none      Limitation/Restriction for FOTO Pelvic Survey    Therapist reviewed FOTO scores for Halley Mariee on 7/2/2024.   FOTO documents entered into [employer] - see Media section.       TREATMENT     Total Treatment time (time-based codes) separate from Evaluation: 30 minutes      Therapeutic Activity Patient participated in dynamic functional therapeutic activities to improve functional performance for 30 minutes. Including: Education as described below:     Patient Education provided:   general anatomy/physiology of urinary/ bowel  system and benefits of treatment were discussed with  the pt. Additionally, anatomy/physiology of pelvic floor, diaphragmatic breathing, and kegels were reviewed.     Home Exercises provided:  Written Home Exercises provided: yes.  Exercises were reviewed and Halley was able to demonstrate them prior to the end of the session.    Halley demonstrated good  understanding of the education provided.     See EMR under Patient Instructions for exercises provided 7/2/2024.    Assessment     Halley is a 38 y.o. female referred to outpatient Physical Therapy with a medical diagnosis of Weakness of pelvic floor [N81.89] . Pt presents with poor trunk stability, pelvic floor tenderness, decreased pelvic muscle strength, decreased endurance of the pelvic muscles, decreased phasic ability of the pelvic muscles, increased tension of the pelvic muscles, increased frequency of urination, increased nocturia, poor coordination of pelvic floor muscles during ADL's leading to urinary or fecal leakage, dysfunctional voiding, and unable to co-contract or co-relax abdominal wall and pelvic floor muscles.      Pt prognosis is Good.   Pt will benefit from skilled outpatient Physical Therapy to address the deficits stated above and in the chart below, provide pt/family education, and to maximize pt's level of independence.     Plan of care discussed with patient: Yes  Pt's spiritual, cultural and educational needs considered and patient is agreeable to the plan of care and goals as stated below:     Anticipated Barriers for therapy: none    Medical Necessity is demonstrated by the following  History  Co-morbidities and personal factors that may impact the plan of care [] LOW: no personal factors / co-morbidities  [x] MODERATE: 1-2 personal factors / co-morbidities  [] HIGH: 3+ personal factors / co-morbidities    Moderate / High Support Documentation:   Co-morbidities affecting plan of care:  has a past medical history of Asthma, Depression affecting pregnancy, postpartum, and Graves disease.      Personal Factors:   no deficits     Examination  Body Structures and Functions, activity limitations and participation restrictions that may impact the plan of care [] LOW: addressing 1-2 elements  [x] MODERATE: 3+ elements  [] HIGH: 4+ elements (please support below)    Moderate / High Support Documentation: see evaluation     Clinical Presentation [] LOW: stable  [x] MODERATE: Evolving  [] HIGH: Unstable     Decision Making/ Complexity Score: moderate         Goals:  Short Term Goals: 6 weeks   - Pt will demonstrate excellent knowledge and adherence to HEP to facilitate optimal recovery.  - Pt will demonstrate proper PFM contraction, relaxation, and lengthening coordinated with TA and breath for improved muscle coordination needed for functional activity.    Long Term Goals: 12 weeks   - Pt will demonstrate excellent knowledge and adherence to HEP for continued self-maintenance of symptoms.  - Pt will report FOTO score of 10% improvement or more indicating clinically relevant increase in function.  - Pt will report voiding interval of 2-3 hours for improved ADL tolerance.  - Pt will report ability to delay urinary urge for at least 15 minutes to maintain continence with ADL/IADLs.   - Pt will report no incidence of urinary incontinence 6/7 days for improved hygiene and ADL/IADL tolerance.   - Pt will report needing </= 1 liner/day indicating improved PFM function needed to maintain continence  - Pt will demonstrate PFM strength of at least 3/5 MMT for improved strength needed to maintain continence.   - Pt will demonstrate independence with pressure-management strategies to decreased stress on adjacent pelvic structures.       Plan     Plan of care Certification: 7/2/2024 to 10/2/2024.    Outpatient Physical Therapy 1 times weekly for 12 weeks to include the following interventions: therapeutic exercises, therapeutic activity, neuromuscular re-education, manual therapy, modalities PRN, patient/family  education, dry needling, and self care/home management    Anne Rossi, PT, DPT, WCS

## 2024-08-01 ENCOUNTER — PATIENT MESSAGE (OUTPATIENT)
Dept: DERMATOLOGY | Facility: CLINIC | Age: 39
End: 2024-08-01
Payer: OTHER GOVERNMENT

## 2024-08-26 ENCOUNTER — OFFICE VISIT (OUTPATIENT)
Dept: FAMILY MEDICINE | Facility: CLINIC | Age: 39
End: 2024-08-26
Payer: OTHER GOVERNMENT

## 2024-08-26 VITALS
HEART RATE: 64 BPM | SYSTOLIC BLOOD PRESSURE: 100 MMHG | OXYGEN SATURATION: 99 % | WEIGHT: 130 LBS | HEIGHT: 69 IN | TEMPERATURE: 98 F | BODY MASS INDEX: 19.26 KG/M2 | DIASTOLIC BLOOD PRESSURE: 60 MMHG

## 2024-08-26 DIAGNOSIS — E03.9 ACQUIRED HYPOTHYROIDISM: Primary | ICD-10-CM

## 2024-08-26 PROCEDURE — 99213 OFFICE O/P EST LOW 20 MIN: CPT | Mod: PBBFAC,PN | Performed by: NURSE PRACTITIONER

## 2024-08-26 PROCEDURE — 99213 OFFICE O/P EST LOW 20 MIN: CPT | Mod: S$PBB,,, | Performed by: NURSE PRACTITIONER

## 2024-08-26 PROCEDURE — 99999 PR PBB SHADOW E&M-EST. PATIENT-LVL III: CPT | Mod: PBBFAC,,, | Performed by: NURSE PRACTITIONER

## 2024-08-26 RX ORDER — THYROID 90 MG/1
90 TABLET ORAL
Qty: 90 TABLET | Refills: 1 | Status: SHIPPED | OUTPATIENT
Start: 2024-08-26

## 2024-08-26 NOTE — PROGRESS NOTES
Subjective:       Patient ID: Halley Mariee is a 38 y.o. female.    Chief Complaint: Thyroid Problem and Follow-up    Patient presents today following up on thyroid and anxiety.  At her last visit 3 months ago, her Synthroid dose was decreased from 150 mcg daily to a 137 mcg daily dose.  TSH has improved but is still too low.  Patient has significant symptoms of fatigue since the dose reduction.  Her free T3 and free T4 are within normal range.  T3 is slightly on the low side.  She has never taken anything other than Synthroid and is open to other thyroid medications.  Her mood has been slightly on the decline but is unsure if it is the thyroid medication that is contributory or changes at home.  She does have a lack of motivation over the last few months.  Patient is a normal weight for height with a BMI of 19.20      Thyroid Problem  Presents for follow-up visit. Symptoms include anxiety and depressed mood. Patient reports no cold intolerance, diaphoresis, diarrhea, dry skin, fatigue, heat intolerance, menstrual problem, nail problem, palpitations or visual change. The symptoms have been improving.   Anxiety  Presents for follow-up visit. Symptoms include depressed mood, excessive worry, irritability and nervous/anxious behavior. Patient reports no chest pain, confusion, nausea, palpitations, shortness of breath or suicidal ideas. Symptoms occur occasionally. The severity of symptoms is mild. The quality of sleep is fair. Nighttime awakenings: occasional.         Review of Systems   Constitutional:  Positive for irritability. Negative for activity change, appetite change, diaphoresis, fatigue and fever.   HENT:  Negative for congestion, ear discharge, ear pain, sore throat, trouble swallowing and voice change.    Eyes:  Negative for photophobia, pain, discharge and visual disturbance.   Respiratory:  Negative for cough, chest tightness and shortness of breath.    Cardiovascular:  Negative for chest pain and  palpitations.   Gastrointestinal:  Negative for abdominal pain, diarrhea, nausea and vomiting.   Endocrine: Negative for cold intolerance and heat intolerance.        Hypothyroid     Genitourinary:  Negative for difficulty urinating, dysuria and menstrual problem.        Postpartum weak pelvic floor   Musculoskeletal:  Negative for arthralgias and gait problem.   Skin:  Negative for rash.   Allergic/Immunologic: Negative for immunocompromised state.   Neurological:  Negative for speech difficulty and headaches.   Psychiatric/Behavioral:  Negative for confusion, self-injury and suicidal ideas. The patient is nervous/anxious.        Past Medical History:   Diagnosis Date    Asthma     Depression affecting pregnancy, postpartum     Graves disease       Past Surgical History:   Procedure Laterality Date    APPENDECTOMY      LAPAROSCOPIC APPENDECTOMY Right        Family History   Problem Relation Name Age of Onset    Hypertension Mother Resolved     Diabetes Mother Resolved     Hypothyroidism Father      Vitiligo Father      Hashimoto's thyroiditis Sister Marlin     Asthma Sister Marlin     Depression Sister Marlin     Epilepsy Brother      Hypertension Maternal Grandmother Mawmaw     Alzheimer's disease Maternal Grandmother Mawmaw     Hyperlipidemia Maternal Grandmother Mawmaw     Melanoma Maternal Grandmother Mawmaw     Hypertension Maternal Grandfather Pawpaw         passed away lung cancer    Cancer Maternal Grandfather Pawpaw     Heart disease Maternal Grandfather Pawpaw     Cirrhosis Paternal Grandmother      Asthma Daughter Prim         1 daugther       Social History     Socioeconomic History    Marital status:     Number of children: 4   Tobacco Use    Smoking status: Never    Smokeless tobacco: Never   Substance and Sexual Activity    Alcohol use: Not Currently    Drug use: Not Currently    Sexual activity: Yes     Partners: Male     Social Determinants of Health     Financial Resource Strain: Low  Risk  (2/21/2024)    Overall Financial Resource Strain (CARDIA)     Difficulty of Paying Living Expenses: Not hard at all   Food Insecurity: No Food Insecurity (2/21/2024)    Hunger Vital Sign     Worried About Running Out of Food in the Last Year: Never true     Ran Out of Food in the Last Year: Never true   Transportation Needs: No Transportation Needs (2/21/2024)    PRAPARE - Transportation     Lack of Transportation (Medical): No     Lack of Transportation (Non-Medical): No   Physical Activity: Insufficiently Active (2/21/2024)    Exercise Vital Sign     Days of Exercise per Week: 3 days     Minutes of Exercise per Session: 10 min   Stress: No Stress Concern Present (2/21/2024)    [employer] - Occupational Stress Questionnaire     Feeling of Stress : Not at all   Housing Stability: Low Risk  (2/21/2024)    Housing Stability Vital Sign     Unable to Pay for Housing in the Last Year: No     Number of Places Lived in the Last Year: 2     Unstable Housing in the Last Year: No       Current Outpatient Medications   Medication Sig Dispense Refill    albuterol (PROVENTIL/VENTOLIN HFA) 90 mcg/actuation inhaler Inhale 2 puffs into the lungs every 6 (six) hours as needed for Wheezing or Shortness of Breath. 18 g 5    CALCIUM-MAGNESIUM-VITAMIN D2 ORAL Take by mouth.      cetirizine 10 mg Cap Take by mouth.      fluticasone-salmeterol diskus inhaler 100-50 mcg Inhale 1 puff into the lungs 2 (two) times daily. Controller 60 each 5    montelukast (SINGULAIR) 10 mg tablet Take 1 tablet (10 mg total) by mouth once daily. 90 tablet 3    prenatal vit no.124/iron/folic (PRENATAL VITAMIN ORAL) Take by mouth Daily.      sertraline (ZOLOFT) 50 MG tablet Take 1 tablet (50 mg total) by mouth once daily. 90 tablet 1    thyroid, pork, (ARMOUR THYROID) 90 mg Tab Take 1 tablet (90 mg total) by mouth before breakfast. 90 tablet 1     No current facility-administered medications for this visit.       Review of  "patient's allergies indicates:   Allergen Reactions    Animal dander Itching, Rash and Shortness Of Breath    Bee pollen      Wheezing   Trigger asthma    Chaste tree extract      Wheezing   Trigger asthma    Grass pollen-red top, standard      Wheezing   Trigger asthma    Mite extract      Wheezing   Trigger asthma    Mold      Wheezing   Trigger asthma     Objective:      Blood pressure 100/60, pulse 64, temperature 98.2 °F (36.8 °C), height 5' 9" (1.753 m), weight 59 kg (130 lb), SpO2 99%, unknown if currently breastfeeding. Body mass index is 19.2 kg/m².   Physical Exam  Vitals and nursing note reviewed.   Constitutional:       General: She is not in acute distress.     Appearance: Normal appearance. She is well-developed.   HENT:      Head: Normocephalic and atraumatic.      Right Ear: External ear normal.      Left Ear: External ear normal.      Nose: Nose normal.      Mouth/Throat:      Mouth: Mucous membranes are moist.   Eyes:      General: Lids are normal. Lids are everted, no foreign bodies appreciated.      Conjunctiva/sclera: Conjunctivae normal.      Pupils: Pupils are equal, round, and reactive to light.      Right eye: Pupil is round and reactive.      Left eye: Pupil is round and reactive.   Neck:      Trachea: Trachea normal.   Cardiovascular:      Rate and Rhythm: Normal rate and regular rhythm.      Pulses: Normal pulses.      Heart sounds: Normal heart sounds, S1 normal and S2 normal.   Pulmonary:      Effort: Pulmonary effort is normal. No respiratory distress.      Breath sounds: Normal breath sounds.   Abdominal:      General: Abdomen is flat. Bowel sounds are normal.      Palpations: Abdomen is soft. Abdomen is not rigid.      Tenderness: There is no abdominal tenderness. There is no guarding.   Musculoskeletal:         General: Normal range of motion.      Cervical back: Normal range of motion and neck supple. No muscular tenderness.   Lymphadenopathy:      Cervical: No cervical adenopathy. "   Skin:     General: Skin is warm and dry.      Capillary Refill: Capillary refill takes less than 2 seconds.   Neurological:      General: No focal deficit present.      Mental Status: She is alert and oriented to person, place, and time. Mental status is at baseline.   Psychiatric:         Mood and Affect: Mood is anxious (controlled).         Behavior: Behavior normal. Behavior is cooperative.         Thought Content: Thought content normal.         Judgment: Judgment normal.             Assessment:       1. Acquired hypothyroidism    2. Body mass index (BMI) 19.9 or less, adult          Plan:       Halley was seen today for thyroid problem and follow-up.    Diagnoses and all orders for this visit:    Acquired hypothyroidism  -     thyroid, pork, (ARMOUR THYROID) 90 mg Tab; Take 1 tablet (90 mg total) by mouth before breakfast.  -     T4, Free; Future  -     T3, Free; Future  -     TSH; Future  -     T4, Free  -     T3, Free  -     TSH    Body mass index (BMI) 19.9 or less, adult  Healthy diet and exercise advised    Changing from 137 mcg of Synthroid daily to [place] thyroid 90 mg daily.  Recheck levels in about 1 month prior to Endocrinology visit.       Follow-up with me in about 5 months for annual wellness. Follow-up sooner if needed

## 2024-08-31 DIAGNOSIS — R45.89 DEPRESSED MOOD: ICD-10-CM

## 2024-08-31 DIAGNOSIS — F41.1 GAD (GENERALIZED ANXIETY DISORDER): ICD-10-CM

## 2024-09-03 RX ORDER — SERTRALINE HYDROCHLORIDE 50 MG/1
50 TABLET, FILM COATED ORAL DAILY
Qty: 90 TABLET | Refills: 1 | Status: SHIPPED | OUTPATIENT
Start: 2024-09-03

## 2024-09-09 ENCOUNTER — PATIENT MESSAGE (OUTPATIENT)
Dept: FAMILY MEDICINE | Facility: CLINIC | Age: 39
End: 2024-09-09
Payer: OTHER GOVERNMENT

## 2024-09-09 DIAGNOSIS — Z01.00 EYE EXAM, ROUTINE: Primary | ICD-10-CM

## 2024-09-20 ENCOUNTER — PATIENT MESSAGE (OUTPATIENT)
Dept: FAMILY MEDICINE | Facility: CLINIC | Age: 39
End: 2024-09-20
Payer: OTHER GOVERNMENT

## 2024-09-24 ENCOUNTER — OFFICE VISIT (OUTPATIENT)
Dept: FAMILY MEDICINE | Facility: CLINIC | Age: 39
End: 2024-09-24
Payer: OTHER GOVERNMENT

## 2024-09-24 VITALS
HEART RATE: 66 BPM | WEIGHT: 130 LBS | SYSTOLIC BLOOD PRESSURE: 90 MMHG | OXYGEN SATURATION: 98 % | DIASTOLIC BLOOD PRESSURE: 60 MMHG | HEIGHT: 69 IN | BODY MASS INDEX: 19.26 KG/M2 | TEMPERATURE: 98 F

## 2024-09-24 DIAGNOSIS — H53.9 VISION CHANGES: ICD-10-CM

## 2024-09-24 DIAGNOSIS — R53.83 FATIGUE, UNSPECIFIED TYPE: ICD-10-CM

## 2024-09-24 DIAGNOSIS — Z01.00 ROUTINE EYE EXAM: ICD-10-CM

## 2024-09-24 DIAGNOSIS — E03.9 ACQUIRED HYPOTHYROIDISM: Primary | ICD-10-CM

## 2024-09-24 DIAGNOSIS — Z23 NEED FOR INFLUENZA VACCINATION: ICD-10-CM

## 2024-09-24 DIAGNOSIS — Z13.21 ENCOUNTER FOR VITAMIN DEFICIENCY SCREENING: ICD-10-CM

## 2024-09-24 PROCEDURE — 99999PBSHW PR PBB SHADOW TECHNICAL ONLY FILED TO HB: Mod: PBBFAC,,,

## 2024-09-24 PROCEDURE — 99215 OFFICE O/P EST HI 40 MIN: CPT | Mod: PBBFAC,PN | Performed by: NURSE PRACTITIONER

## 2024-09-24 PROCEDURE — 90471 IMMUNIZATION ADMIN: CPT | Mod: PBBFAC,PN

## 2024-09-24 PROCEDURE — 99213 OFFICE O/P EST LOW 20 MIN: CPT | Mod: S$PBB,,, | Performed by: NURSE PRACTITIONER

## 2024-09-24 PROCEDURE — 90656 IIV3 VACC NO PRSV 0.5 ML IM: CPT | Mod: PBBFAC,PN

## 2024-09-24 PROCEDURE — 99999 PR PBB SHADOW E&M-EST. PATIENT-LVL V: CPT | Mod: PBBFAC,,, | Performed by: NURSE PRACTITIONER

## 2024-09-24 RX ORDER — LEVOTHYROXINE SODIUM 150 UG/1
150 TABLET ORAL
COMMUNITY

## 2024-09-24 RX ADMIN — INFLUENZA VIRUS VACCINE 0.5 ML: 15; 15; 15 SUSPENSION INTRAMUSCULAR at 04:09

## 2024-09-24 NOTE — PROGRESS NOTES
Subjective:       Patient ID: Halley Mariee is a 38 y.o. female.    Chief Complaint: Thyroid Problem and Follow-up    Patient presents today following up on thyroid and anxiety.  At her last visit   She was changed to [place] thyroid.  Patient took this new medication for a few days and felt that she was having an allergic reaction to it.  She does have a history that she forgot about.  She states she is allergic to pork as a child.  She went back to taking Synthroid 150 mcg.  She is taking this daily and skipping 1 day of the week.  She does have an appointment with Endocrinology scheduled next week.    She is in need of Optometry and Ophthalmology referrals.  Patient is a normal weight for height with a BMI of 19.20      Thyroid Problem  Presents for follow-up visit. Symptoms include anxiety and depressed mood. Patient reports no cold intolerance, diaphoresis, diarrhea, dry skin, fatigue, heat intolerance, menstrual problem, nail problem, palpitations or visual change. The symptoms have been improving.   Anxiety  Presents for follow-up visit. Symptoms include depressed mood, excessive worry, irritability and nervous/anxious behavior. Patient reports no chest pain, confusion, nausea, palpitations, shortness of breath or suicidal ideas. Symptoms occur occasionally. The severity of symptoms is mild. The quality of sleep is fair. Nighttime awakenings: occasional.         Review of Systems   Constitutional:  Positive for irritability. Negative for activity change, appetite change, diaphoresis, fatigue and fever.   HENT:  Negative for congestion, ear discharge, ear pain, sore throat, trouble swallowing and voice change.    Eyes:  Negative for photophobia, pain, discharge and visual disturbance.   Respiratory:  Negative for cough, chest tightness and shortness of breath.    Cardiovascular:  Negative for chest pain and palpitations.   Gastrointestinal:  Negative for abdominal pain, diarrhea, nausea and vomiting.    Endocrine: Negative for cold intolerance and heat intolerance.        Hypothyroid     Genitourinary:  Negative for difficulty urinating, dysuria and menstrual problem.        Postpartum weak pelvic floor   Musculoskeletal:  Negative for arthralgias and gait problem.   Skin:  Negative for rash.   Allergic/Immunologic: Negative for immunocompromised state.   Neurological:  Negative for speech difficulty and headaches.   Psychiatric/Behavioral:  Negative for confusion, self-injury and suicidal ideas. The patient is nervous/anxious.        Past Medical History:   Diagnosis Date    Asthma     Depression affecting pregnancy, postpartum     Graves disease       Past Surgical History:   Procedure Laterality Date    APPENDECTOMY      LAPAROSCOPIC APPENDECTOMY Right        Family History   Problem Relation Name Age of Onset    Hypertension Mother Resolved     Diabetes Mother Resolved     Hypothyroidism Father      Vitiligo Father      Hashimoto's thyroiditis Sister Marlin     Asthma Sister Marlin     Depression Sister Marlin     Epilepsy Brother      Hypertension Maternal Grandmother Mawmaw     Alzheimer's disease Maternal Grandmother Mawmaw     Hyperlipidemia Maternal Grandmother Mawmaw     Melanoma Maternal Grandmother Mawmaw     Hypertension Maternal Grandfather Pawpaw         passed away lung cancer    Cancer Maternal Grandfather Pawpaw     Heart disease Maternal Grandfather P[place]aw     Cirrhosis Paternal Grandmother      Asthma Daughter Prim         1 daugther       Social History     Socioeconomic History    Marital status:     Number of children: 4   Tobacco Use    Smoking status: Never    Smokeless tobacco: Never   Substance and Sexual Activity    Alcohol use: Not Currently    Drug use: Not Currently    Sexual activity: Yes     Partners: Male     Social Determinants of Health     Financial Resource Strain: Low Risk  (2/21/2024)    Overall Financial Resource Strain (CARDIA)     Difficulty of Paying Living  Expenses: Not hard at all   Transportation Needs: No Transportation Needs (2/21/2024)    PRAPARE - Transportation     Lack of Transportation (Medical): No     Lack of Transportation (Non-Medical): No   Physical Activity: Insufficiently Active (2/21/2024)    Exercise Vital Sign     Days of Exercise per Week: 3 days     Minutes of Exercise per Session: 10 min   Stress: No Stress Concern Present (2/21/2024)    [employer] - Occupational Stress Questionnaire     Feeling of Stress : Not at all   Housing Stability: Low Risk  (2/21/2024)    Housing Stability Vital Sign     Unable to Pay for Housing in the Last Year: No     Number of Places Lived in the Last Year: 2     Unstable Housing in the Last Year: No       Current Outpatient Medications   Medication Sig Dispense Refill    albuterol (PROVENTIL/VENTOLIN HFA) 90 mcg/actuation inhaler Inhale 2 puffs into the lungs every 6 (six) hours as needed for Wheezing or Shortness of Breath. 18 g 5    CALCIUM-MAGNESIUM-VITAMIN D2 ORAL Take by mouth.      cetirizine 10 mg Cap Take by mouth.      fluticasone-salmeterol diskus inhaler 100-50 mcg Inhale 1 puff into the lungs 2 (two) times daily. Controller 60 each 5    levothyroxine (SYNTHROID) 150 MCG tablet Take 150 mcg by mouth before breakfast. 1 po Mon thru sat.      montelukast (SINGULAIR) 10 mg tablet Take 1 tablet (10 mg total) by mouth once daily. 90 tablet 3    prenatal vit no.124/iron/folic (PRENATAL VITAMIN ORAL) Take by mouth Daily.      sertraline (ZOLOFT) 50 MG tablet Take 1 tablet (50 mg total) by mouth once daily. 90 tablet 1     No current facility-administered medications for this visit.       Review of patient's allergies indicates:   Allergen Reactions    Animal dander Itching, Rash and Shortness Of Breath    Bee pollen      Wheezing   Trigger asthma    Chaste tree extract      Wheezing   Trigger asthma    Grass pollen-red top, standard      Wheezing   Trigger asthma    Mite extract       "Wheezing   Trigger asthma    Mold      Wheezing   Trigger asthma     Objective:      Blood pressure 90/60, pulse 66, temperature 98 °F (36.7 °C), height 5' 9" (1.753 m), weight 59 kg (130 lb), SpO2 98%, unknown if currently breastfeeding. Body mass index is 19.2 kg/m².   Physical Exam  Vitals and nursing note reviewed.   Constitutional:       General: She is not in acute distress.     Appearance: Normal appearance. She is well-developed. She is not ill-appearing.   HENT:      Head: Normocephalic and atraumatic.      Right Ear: External ear normal.      Left Ear: External ear normal.      Nose: Nose normal.      Mouth/Throat:      Mouth: Mucous membranes are moist.   Eyes:      General: Lids are normal. Lids are everted, no foreign bodies appreciated.      Conjunctiva/sclera: Conjunctivae normal.      Pupils: Pupils are equal, round, and reactive to light.      Right eye: Pupil is round and reactive.      Left eye: Pupil is round and reactive.   Neck:      Trachea: Trachea normal.   Cardiovascular:      Rate and Rhythm: Normal rate and regular rhythm.      Pulses: Normal pulses.      Heart sounds: Normal heart sounds, S1 normal and S2 normal.   Pulmonary:      Effort: Pulmonary effort is normal. No respiratory distress.      Breath sounds: Normal breath sounds.   Abdominal:      General: Abdomen is flat. Bowel sounds are normal.      Palpations: Abdomen is soft. Abdomen is not rigid.      Tenderness: There is no abdominal tenderness. There is no guarding.   Musculoskeletal:         General: Normal range of motion.      Cervical back: Normal range of motion and neck supple. No muscular tenderness.   Lymphadenopathy:      Cervical: No cervical adenopathy.   Skin:     General: Skin is warm and dry.      Capillary Refill: Capillary refill takes less than 2 seconds.   Neurological:      General: No focal deficit present.      Mental Status: She is alert and oriented to person, place, and time. Mental status is at " baseline.   Psychiatric:         Mood and Affect: Mood is anxious (controlled).         Behavior: Behavior normal. Behavior is cooperative.         Thought Content: Thought content normal.         Judgment: Judgment normal.             Assessment:       1. Acquired hypothyroidism    2. Need for influenza vaccination    3. Vision changes    4. Routine eye exam    5. Fatigue, unspecified type    6. Encounter for vitamin deficiency screening          Plan:       Halley was seen today for thyroid problem and follow-up.    Diagnoses and all orders for this visit:    Acquired hypothyroidism  -     Ambulatory referral/consult to Ophthalmology; Future    Need for influenza vaccination  -     influenza (Flulaval, Fluzone, Fluarix) 45 mcg/0.5 mL IM vaccine (> or = 6 mo) 0.5 mL    Vision changes  -     Ambulatory referral/consult to Ophthalmology; Future  -     Ambulatory referral/consult to Optometry; Future    Routine eye exam  -     Ambulatory referral/consult to Optometry; Future    Fatigue, unspecified type  -     Vitamin D; Future  -     Vitamin B12; Future  -     Vitamin D  -     Vitamin B12    Encounter for vitamin deficiency screening  -     Vitamin D; Future  -     Vitamin B12; Future  -     Vitamin D  -     Vitamin B12           Follow-up is scheduled in January with labs prior to office visit for wellness visit.

## 2024-10-03 ENCOUNTER — OFFICE VISIT (OUTPATIENT)
Dept: ENDOCRINOLOGY | Facility: CLINIC | Age: 39
End: 2024-10-03
Payer: OTHER GOVERNMENT

## 2024-10-03 VITALS
SYSTOLIC BLOOD PRESSURE: 95 MMHG | HEART RATE: 75 BPM | WEIGHT: 132.63 LBS | BODY MASS INDEX: 19.58 KG/M2 | DIASTOLIC BLOOD PRESSURE: 65 MMHG

## 2024-10-03 DIAGNOSIS — E05.00 GRAVES DISEASE: ICD-10-CM

## 2024-10-03 DIAGNOSIS — E03.9 ACQUIRED HYPOTHYROIDISM: Primary | ICD-10-CM

## 2024-10-03 PROCEDURE — 99204 OFFICE O/P NEW MOD 45 MIN: CPT | Mod: S$PBB,,, | Performed by: HOSPITALIST

## 2024-10-03 PROCEDURE — 99999 PR PBB SHADOW E&M-EST. PATIENT-LVL III: CPT | Mod: PBBFAC,,, | Performed by: HOSPITALIST

## 2024-10-03 PROCEDURE — 99213 OFFICE O/P EST LOW 20 MIN: CPT | Mod: PBBFAC | Performed by: HOSPITALIST

## 2024-10-03 RX ORDER — LEVOTHYROXINE SODIUM 150 UG/1
TABLET ORAL
Qty: 90 TABLET | Refills: 3 | Status: SHIPPED | OUTPATIENT
Start: 2024-10-03

## 2024-10-03 NOTE — PROGRESS NOTES
"Subjective:      Patient ID: Halley Mariee is a 38 y.o. female presented to [facility] on 10/3/2024.  Chief complaint:  Hypothyroidism      History of Present illness: Halley Mariee is a 38 y.o. female here for  hypothyroidism  Other significant past medical history:   Current PCP Keisha Shaw FNP-C    Interval history: Here for establishment of care history of hypothyroidism status post BATISTA ablation due to Graves  Patient is is in the , in the past was managed by endocrinology.  Now managed by PCP   Currently on Synthroid 150 mcg 6 days a week  Dose was decreased over the last few months due to suppressed TSH in symptoms of hyperthyroidism   Patient reports feeling much better now, fatigue has improved.  Does have lactose intolerance      Hypothyroidism  - Diagnosed  in: age 11, GRAVES treated at that time with BATISTA  - Family history thyroid disorder: yes, dad with hypothyroidism and twin sister with Hashimoto  - Family history of thyroid cancer: no  - Hx of Thyroid goiter: no  - Tobacco user: no  - no plans for future pregnancy, 4 child    - Current medication: Synthroid 150 mcg 6 days a week, skip SUNDAY. Avg 128 mcg/d  - Avg weight basel: 96 mcg/d  - Takes thyroid medication properly without food first thing in the morning, yes    Current symptoms:   No   Yes  [x]    []   Weight gain  []    [x]   Fatigue>> improved  [x]    []   Constipation  [x]    []   Hair loss  [x]    []   Brittle nails  [x]    []   Mental fog  [x]    []   Cold intolerance  [x]    []   Recent severe illness  [x]    []   Neck swelling, pain, pressure  [x]    []   Hoarseness    Medications:  [x]    []   Lithium Use  [x]    []   Amiodarone use  [x]    []   NSAIDs/systemic steroid  []    []   Chemotherapy   []    []   Radiation Treatment  []    []   Estrogen therapy  []    []   Supplements:  That includes:  High dose iodine: Including "thyroid support", Kelp, Iodine drops, [place] barry, " "Bladder wrack  []    []   Other Supplements: Biotin, Ashwagandha, selenium    Reproductive Issue:  []    []   Galactorrhea  [x]    []   Infertility/abnormal menstruation  [x]    []   Recent pregnancy  [x]    []   Decreased libido, erectile dysfunction    Thyroid lab work  Lab Results   Component Value Date    TSH 0.363 (L) 08/23/2024    TSH 0.034 (L) 05/06/2024    TSH <0.010 (L) 01/11/2024    T4FREE 1.66 08/23/2024    T4FREE 1.39 05/06/2024    FREET4 1.22 01/11/2024      Antibodies  No results found for: "THYROPEROXID", "THYROIDAB", "TSIMMGLBN", "THYROIDSTIMI", "THYROTROPINR", "THGABSCRN"    The patient's medications, allergies, past medical, surgical, social and family histories were reviewed and updated as appropriate.  Review of Systems: pertinent positives as per the above HPI, and otherwise negative.    Objective:   BP 95/65   Pulse 75   Wt 60.1 kg (132 lb 9.6 oz)   BMI 19.58 kg/m²   Body mass index is 19.58 kg/m².  Vital signs reviewed    Physical Exam  Vitals and nursing note reviewed.   Constitutional:       Appearance: Normal appearance. She is well-developed. She is not ill-appearing.   Neck:      Thyroid: No thyromegaly.   Pulmonary:      Effort: Pulmonary effort is normal. No respiratory distress.   Musculoskeletal:         General: Normal range of motion.      Cervical back: Normal range of motion.   Neurological:      General: No focal deficit present.      Mental Status: She is alert. Mental status is at baseline.   Psychiatric:         Mood and Affect: Mood normal.         Behavior: Behavior normal.         Labs reviewed:  See results in subjective  No results found for: "HGBA1C"  Lab Results   Component Value Date    CHOL 165 05/06/2024    HDL 53 05/06/2024    LDLCALC 101 (H) 05/06/2024    TRIG 57 05/06/2024     Lab Results   Component Value Date     05/06/2024    K 4.1 05/06/2024     05/06/2024    CO2 24 05/06/2024    GLU 81 05/06/2024    BUN 12 05/06/2024    CREATININE 0.63 " 05/06/2024    CALCIUM 9.5 05/06/2024    ALBUMIN 4.7 05/06/2024    BILITOT 0.5 05/06/2024    AST 18 05/06/2024    ALT 19 05/06/2024    EGFRNORACEVR 116 05/06/2024    TSH 0.363 (L) 08/23/2024     Assessment     1. Acquired hypothyroidism  Ambulatory referral/consult to Endocrinology    TSH    T4, Free    T3    Thyroid Stimulating Immunoglobulin    Thyroid Peroxidase Antibody    Vitamin D    SYNTHROID 150 mcg tablet    COMPREHENSIVE METABOLIC PANEL      2. Graves disease           Plan     Acquired hypothyroidism  - Hypothyroidism due to autoimmune thyroiditis:  Reportedly Graves status post BATISTA at age 11  - I reviewed lab work trends: TSH, Free T4, with patient in clinic today 10/3/2024   - Discussed and confirmed the proper way of taking Synthroid: daily on an empty stomach, waiting 30 minutes before any other medication. The patient verbalized understanding.  - CONTINUE Synthroid 150 mcg daily: 6 days a week skip Sunday. Avg 128 mcg/d  - Trend lab work TSH, FT4, and check TPO every 4-6 months> follow-up with endocrinology to adjust medication  - Once TSH/T4 in normal range, patient should continue refills with their PCP Keisha Shaw FNP-C, given chronic nature of hypothyroidism    - no plans for future pregnancy  - recommend avoid the use of [place] thyroid, discussed with patient    Graves disease  - reports history of BATISTA at age 11 for Graves  - will check thyroid antibody  - Recommended a gluten-free or gluten-avoidance diet due to its potential association with autoimmune conditions of the small intestine.  - Provided an anti-inflammatory diet handout to support further dietary changes for long-term thyroid and immune health.  - Recommended over-the-counter Vitamin D3 supplements for immune support.  - Suggested daily Selenium supplements (100-200 mcg) to help reduce antibody levels and support antioxidant metabolism.        Advised patient to follow up with PCP for routine health maintenance care.   RTC in  4-6 months, virtual visit    Mark Kee M.D.  Endocrinology  [facility]  10/3/2024      Disclaimer: This note has been generated in part with the use of voice-recognition software. There may be typographical errors that have been missed during proof-reading.

## 2024-10-03 NOTE — ASSESSMENT & PLAN NOTE
- Hypothyroidism due to autoimmune thyroiditis:  Reportedly Graves status post BATISTA at age 11  - I reviewed lab work trends: TSH, Free T4, with patient in clinic today 10/3/2024   - Discussed and confirmed the proper way of taking Synthroid: daily on an empty stomach, waiting 30 minutes before any other medication. The patient verbalized understanding.  - CONTINUE Synthroid 150 mcg daily: 6 days a week skip Sunday. Avg 128 mcg/d  - Trend lab work TSH, FT4, and check TPO every 4-6 months> follow-up with endocrinology to adjust medication  - Once TSH/T4 in normal range, patient should continue refills with their PCP Keisha Shaw FNP-C, given chronic nature of hypothyroidism    - no plans for future pregnancy  - recommend avoid the use of [place] thyroid, discussed with patient

## 2024-10-03 NOTE — ASSESSMENT & PLAN NOTE
- reports history of BATISTA at age 11 for Graves  - will check thyroid antibody  - Recommended a gluten-free or gluten-avoidance diet due to its potential association with autoimmune conditions of the small intestine.  - Provided an anti-inflammatory diet handout to support further dietary changes for long-term thyroid and immune health.  - Recommended over-the-counter Vitamin D3 supplements for immune support.  - Suggested daily Selenium supplements (100-200 mcg) to help reduce antibody levels and support antioxidant metabolism.

## 2024-10-10 ENCOUNTER — PATIENT MESSAGE (OUTPATIENT)
Dept: FAMILY MEDICINE | Facility: CLINIC | Age: 39
End: 2024-10-10
Payer: OTHER GOVERNMENT

## 2024-10-10 DIAGNOSIS — H57.13 PAIN OF BOTH EYES: Primary | ICD-10-CM

## 2024-10-31 ENCOUNTER — PATIENT MESSAGE (OUTPATIENT)
Dept: FAMILY MEDICINE | Facility: CLINIC | Age: 39
End: 2024-10-31
Payer: OTHER GOVERNMENT

## 2024-11-26 ENCOUNTER — ON-DEMAND VIRTUAL (OUTPATIENT)
Dept: URGENT CARE | Facility: CLINIC | Age: 39
End: 2024-11-26
Payer: OTHER GOVERNMENT

## 2024-11-26 DIAGNOSIS — B34.9 VIRAL ILLNESS: Primary | ICD-10-CM

## 2024-11-26 PROCEDURE — 99499 UNLISTED E&M SERVICE: CPT | Mod: 95,S$GLB,, | Performed by: NURSE PRACTITIONER

## 2024-11-26 NOTE — PROGRESS NOTES
Subjective:      Patient ID: Halley Mariee is a 39 y.o. female.    Vitals:  vitals were not taken for this visit.     Chief Complaint: Sinus Problem      Visit Type: TELE AUDIOVISUAL    Present with the patient at the time of consultation: TELEMED PRESENT WITH PATIENT: family member    Past Medical History:   Diagnosis Date    Asthma     Depression affecting pregnancy, postpartum     Graves disease      Past Surgical History:   Procedure Laterality Date    APPENDECTOMY      LAPAROSCOPIC APPENDECTOMY Right      Review of patient's allergies indicates:   Allergen Reactions    Animal dander Itching, Rash and Shortness Of Breath    Bee pollen      Wheezing   Trigger asthma    Chaste tree extract      Wheezing   Trigger asthma    Grass pollen-red top, standard      Wheezing   Trigger asthma    Mite extract      Wheezing   Trigger asthma    Mold      Wheezing   Trigger asthma     Current Outpatient Medications on File Prior to Visit   Medication Sig Dispense Refill    albuterol (PROVENTIL/VENTOLIN HFA) 90 mcg/actuation inhaler Inhale 2 puffs into the lungs every 6 (six) hours as needed for Wheezing or Shortness of Breath. 18 g 5    CALCIUM-MAGNESIUM-VITAMIN D2 ORAL Take by mouth.      cetirizine 10 mg Cap Take by mouth.      fluticasone-salmeterol diskus inhaler 100-50 mcg Inhale 1 puff into the lungs 2 (two) times daily. Controller 60 each 5    montelukast (SINGULAIR) 10 mg tablet Take 1 tablet (10 mg total) by mouth once daily. 90 tablet 3    prenatal vit no.124/iron/folic (PRENATAL VITAMIN ORAL) Take by mouth Daily.      sertraline (ZOLOFT) 50 MG tablet Take 1 tablet (50 mg total) by mouth once daily. 90 tablet 1    SYNTHROID 150 mcg tablet Take 1 pill daily 6 days a week, SKIP SUNDAY 90 tablet 3     No current facility-administered medications on file prior to visit.     Family History   Problem Relation Name Age of Onset    Hypertension Mother Resolved     Diabetes Mother Resolved     Hypothyroidism Father       Vitiligo Father      Hashimoto's thyroiditis Sister Marlin     Asthma Sister Marlin     Depression Sister Marlin     Epilepsy Brother      Hypertension Maternal Grandmother Mawmaw     Alzheimer's disease Maternal Grandmother Mawmaw     Hyperlipidemia Maternal Grandmother Mawmaw     Melanoma Maternal Grandmother Mawmaw     Hypertension Maternal Grandfather Pawpaw         passed away lung cancer    Cancer Maternal Grandfather Pawpaw     Heart disease Maternal Grandfather Pawpaw     Cirrhosis Paternal Grandmother      Asthma Daughter Prim         1 daugther           Ohs Peq Odvv Intake    11/26/2024  9:07 AM CST - Filed by Patient   What is your current physical address in the event of a medical emergency? [id number], [place] [address]   Are you able to take your vital signs? Yes   Systolic Blood Pressure: 89   Diastolic Blood Pressure: 54   Weight: 132   Height: 69   Pulse: 63   Temperature: 98   Respiration rate: 16   Pulse Oxygen: 99   Please attach any relevant images or files    Is your employer contracted with [facility]? No         39 year old female who is currently breastfeeding calls in today with complaints of body aches, ear congestion, cough, loss of voice that began a couple of days ago.       ROS     Objective:   The physical exam was conducted virtually.  Physical Exam   Constitutional: She is oriented to person, place, and time. No distress.   HENT:   Head: Normocephalic and atraumatic.   Nose: Rhinorrhea present.   Mouth/Throat: Oropharynx is clear and moist and mucous membranes are normal.   Loss of voice noted       Comments: Loss of voice noted   Eyes: Conjunctivae are normal. No scleral icterus.   Pulmonary/Chest: Effort normal. No respiratory distress.   Musculoskeletal: Normal range of motion.         General: Normal range of motion.   Neurological: She is alert and oriented to person, place, and time.   Skin: Skin is not diaphoretic.   Psychiatric: Her behavior is  normal. Judgment and thought content normal.   Vitals reviewed.      Assessment:     1. Viral illness        Plan:       Viral illness                  PLEASE READ YOUR DISCHARGE INSTRUCTIONS ENTIRELY AS IT CONTAINS IMPORTANT INFORMATION.      Please drink plenty of fluids.     Please get plenty of rest.     Please return here or go to the Emergency Department for any concerns or worsening of condition.    Decongestants and antihistamines may diminish your milk supply.      If not allergic, please take over the counter Tylenol (Acetaminophen) and/or Motrin (Ibuprofen) as directed for control of pain and/or fever.  Please follow up with your primary care doctor or specialist as needed.     Sore throat recommendations: Warm fluids, warm salt water gargles, throat lozenges, tea, honey, soup, rest, hydration.     Use over the counter flonase: one spray each nostril twice daily OR two sprays each nostril once daily.      If you smoke, please stop smoking.        Please return or see your primary care doctor if you develop new or worsening symptoms.      Please arrange follow up with your primary medical clinic as soon as possible. You must understand that you've received an Urgent Care treatment only and that you may be released before all of your medical problems are known or treated. You, the patient, will arrange for follow up as instructed. If your symptoms worsen or fail to improve you should go to the Emergency Room.    Thank you for choosing [facility]!    Our goal in the [facility] is to always provide outstanding medical care. You may receive a survey by mail or e-mail in the next week regarding your experience today. We would greatly appreciate you completing and returning the survey. Your feedback provides us with a way to recognize our staff who provide very good care, and it helps us learn how to improve when your experience was below our aspiration of excellence.          We appreciate you trusting us with your medical care. We hope you feel better soon. We will be happy to take care of you for all of your future medical needs.    You must understand that you've received an Urgent Care treatment only and that you may be released before all your medical problems are known or treated. You, the patient, will arrange for follow up care as instructed.    Follow up with your PCP or specialty clinic as directed in the next 1-2 weeks if not improved or as needed.  You can call [phone] to schedule an appointment with the appropriate provider.    If your condition worsens we recommend that you receive another evaluation in person, with your primary care provider, urgent care or at the emergency room immediately or contact your primary medical clinics after hours call service to discuss your concerns.

## 2024-12-12 ENCOUNTER — PATIENT MESSAGE (OUTPATIENT)
Dept: FAMILY MEDICINE | Facility: CLINIC | Age: 39
End: 2024-12-12
Payer: OTHER GOVERNMENT

## 2025-01-27 ENCOUNTER — OFFICE VISIT (OUTPATIENT)
Dept: FAMILY MEDICINE | Facility: CLINIC | Age: 40
End: 2025-01-27
Payer: OTHER GOVERNMENT

## 2025-01-27 VITALS
WEIGHT: 130 LBS | HEART RATE: 55 BPM | BODY MASS INDEX: 19.26 KG/M2 | SYSTOLIC BLOOD PRESSURE: 100 MMHG | TEMPERATURE: 98 F | HEIGHT: 69 IN | OXYGEN SATURATION: 99 % | DIASTOLIC BLOOD PRESSURE: 60 MMHG

## 2025-01-27 DIAGNOSIS — Z01.419 ENCOUNTER FOR WELL WOMAN EXAM WITH ROUTINE GYNECOLOGICAL EXAM: ICD-10-CM

## 2025-01-27 DIAGNOSIS — F41.1 GAD (GENERALIZED ANXIETY DISORDER): ICD-10-CM

## 2025-01-27 DIAGNOSIS — J45.21 MILD INTERMITTENT ASTHMA WITH ACUTE EXACERBATION: ICD-10-CM

## 2025-01-27 DIAGNOSIS — R45.89 DEPRESSED MOOD: ICD-10-CM

## 2025-01-27 DIAGNOSIS — Z00.00 ANNUAL PHYSICAL EXAM: Primary | ICD-10-CM

## 2025-01-27 DIAGNOSIS — N94.0 OVULATION PAIN: ICD-10-CM

## 2025-01-27 PROCEDURE — 99214 OFFICE O/P EST MOD 30 MIN: CPT | Mod: PBBFAC,PN | Performed by: NURSE PRACTITIONER

## 2025-01-27 PROCEDURE — 99395 PREV VISIT EST AGE 18-39: CPT | Mod: S$PBB,,, | Performed by: NURSE PRACTITIONER

## 2025-01-27 PROCEDURE — 99999 PR PBB SHADOW E&M-EST. PATIENT-LVL IV: CPT | Mod: PBBFAC,,, | Performed by: NURSE PRACTITIONER

## 2025-01-27 RX ORDER — FLUTICASONE PROPIONATE AND SALMETEROL 100; 50 UG/1; UG/1
1 POWDER RESPIRATORY (INHALATION) 2 TIMES DAILY
Qty: 60 EACH | Refills: 5 | Status: SHIPPED | OUTPATIENT
Start: 2025-01-27

## 2025-01-27 RX ORDER — MONTELUKAST SODIUM 10 MG/1
10 TABLET ORAL DAILY
Qty: 90 TABLET | Refills: 3 | Status: SHIPPED | OUTPATIENT
Start: 2025-01-27

## 2025-01-27 RX ORDER — SERTRALINE HYDROCHLORIDE 50 MG/1
50 TABLET, FILM COATED ORAL DAILY
Qty: 90 TABLET | Refills: 1 | Status: SHIPPED | OUTPATIENT
Start: 2025-01-27

## 2025-01-27 NOTE — PROGRESS NOTES
HPI: Halley Mariee  is a 39 y.o. female who presents for annual physical .  No complaints at this time.  History of Present Illness    CHIEF COMPLAINT:  Patient presents for an annual wellness visit and to request a referral to OB/GYN for severe menstrual symptoms.    HPI:  Patient reports severe symptoms during her ovulation week, including intense pain comparable to menstrual cramps that is incapacitating. She also has extreme fatigue, requiring 3-hour naps for approximately 2 days. These symptoms are new, coinciding with her first menstrual cycle since her child turned 1 year old. Patient feels fine during her actual menstrual week. She is seeking a referral to an OB/GYN to address these issues.    MEDICATIONS:  Patient is on Synthroid and Singulair daily. She is also taking Zoloft 50 mg daily and a Vitamin D supplement.    MEDICAL HISTORY:  Patient has a history of hypothyroidism and asthma. Patient has been pregnant once (G1) and has one child (P1). She has a one-year-old daughter. Her lactation history includes breastfeeding for her first child, and she has recently weaned. Patient received a Tetanus vaccine in 2020.    TEST RESULTS:  Patient's Free T4 and Free T3 levels are good. Her TSH is 2.6, Vitamin D is 33, and B12 is 535, which is normal and sufficient.    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      ROS:  General: -fever, -chills, +fatigue, -weight gain, -weight loss  Eyes: -vision changes, -redness, -discharge  ENT: -ear pain, -nasal congestion, -sore throat  Cardiovascular: -chest pain, -palpitations, -lower extremity edema  Respiratory: -cough, -shortness of breath  Gastrointestinal: -abdominal pain, -nausea, -vomiting, -diarrhea, -constipation, -blood in stool  Genitourinary: -dysuria, -hematuria, -frequency  Musculoskeletal: -joint pain, -muscle pain  Skin: -rash, -lesion  Neurological: -headache, -dizziness, -numbness,  -tingling  Psychiatric: -anxiety, -depression, -sleep difficulty         Review of patient's allergies indicates:   Allergen Reactions    Animal dander Itching, Rash and Shortness Of Breath    Bee pollen      Wheezing   Trigger asthma    Chaste tree extract      Wheezing   Trigger asthma    Grass pollen-red top, standard      Wheezing   Trigger asthma    Mite extract      Wheezing   Trigger asthma    Mold      Wheezing   Trigger asthma     Past Medical History:   Diagnosis Date    Asthma     Depression affecting pregnancy, postpartum     Graves disease      Past Surgical History:   Procedure Laterality Date    APPENDECTOMY      LAPAROSCOPIC APPENDECTOMY Right      Family History   Problem Relation Name Age of Onset    Hypertension Mother Resolved     Diabetes Mother Resolved     Hypothyroidism Father      Vitiligo Father      Hashimoto's thyroiditis Sister Marlin     Asthma Sister Marlin     Depression Sister Marlin     Epilepsy Brother      Hypertension Maternal Grandmother Mawmaw     Alzheimer's disease Maternal Grandmother Mawmaw     Hyperlipidemia Maternal Grandmother Mawmaw     Melanoma Maternal Grandmother Mawmaw     Hypertension Maternal Grandfather Pawpaw         passed away lung cancer    Cancer Maternal Grandfather Pawpaw     Heart disease Maternal Grandfather Pawpaw     Cirrhosis Paternal Grandmother      Asthma Daughter Prim         1 daugther     Social History     Tobacco Use    Smoking status: Never    Smokeless tobacco: Never   Substance Use Topics    Alcohol use: Not Currently    Drug use: Not Currently      Health Maintenance Topics with due status: Not Due       Topic Last Completion Date    TETANUS VACCINE 04/01/2020    Cervical Cancer Screening 07/31/2020    RSV Vaccine (Age 60+ and Pregnant patients) Not Due     Immunization History   Administered Date(s) Administered    COVID-19, MRNA, LN-S, PF (Pfizer) (Purple Cap) 04/07/2021    Hepatitis B, Adult 04/18/2011    Influenza 09/18/2019     Influenza - Quadrivalent - PF *Preferred* (6 months and older) 11/16/2022, 11/04/2023    Influenza - Trivalent - Fluarix, Flulaval, Fluzone, Afluria - PF 09/24/2024    MMR 03/25/1987    Rho (D) Immune Globulin - IM 11/13/2023    Tdap 07/06/2016, 04/01/2020    Varicella 01/01/1990     OBJECTIVE:      Vitals:    01/27/25 0912   BP: 100/60   Pulse: (!) 55   Temp: 98.1 °F (36.7 °C)     Physical Exam    General: No acute distress. Well-developed. Well-nourished.  Eyes: EOMI. Sclerae anicteric.  HENT: Normocephalic. Atraumatic. Nares patent. Moist oral mucosa.  Ears: Bilateral TMs clear. Bilateral EACs clear.  Cardiovascular: Regular rate. Regular rhythm. No murmurs. No rubs. No gallops. Normal S1, S2.  Respiratory: Normal respiratory effort. Clear to auscultation bilaterally. No rales. No rhonchi. No wheezing.  Abdomen: Soft. Non-tender. Non-distended. Normoactive bowel sounds.  Musculoskeletal: No  obvious deformity.  Extremities: No lower extremity edema.  Neurological: Alert & oriented x3. No slurred speech. Normal gait.  Psychiatric: Normal mood. Normal affect. Good insight. Good judgment.  Skin: Warm. Dry. No rash.         Assessment:       Assessment & Plan    - Assessed thyroid function: free T4, free T3, and TSH (2.6) all within normal range  - Evaluated vitamin D level at 33, slightly low but within normal range  - Reviewed asthma management, noting patient not using daily inhaler as prescribed  - Considered OB referral for menstrual-related symptoms during ovulation    HYPOTHYROIDISM:  - Continued Synthroid at the current dose for hypothyroidism management.  - Reviewed thyroid lab results with the patient.  - Noted that thyroid labs looked good, with TSH at 2.6, and free T4 and free T3 levels within normal range.  - Confirmed that the patient recently saw endocrinology and thyroid labs were checked, including free T4, free T3, and TSH levels.  - Explained the pork-derived nature of [place] Thyroid and potential  regulatory challenges.    VITAMIN D DEFICIENCY:  - Noted that the patient's vitamin D level was checked and found to be 33 ng/mL, which is low.  - Explained that the normal range for vitamin D is 30 to 100 ng/mL, with 50-70 ng/mL being optimal.  - Assessed that the patient's vitamin D level needs to be increased.  - Discussed optimal vitamin D levels (50-70 ng/mL) for improved energy.  - Advised the patient to double the current vitamin D supplement dosage, not to exceed 5000 IU daily.    DEPRESSION:  - Continued Zoloft 50 mg for depression management.  - Provided a refill of Zoloft 50 mg to [facility].    ASTHMA:  - Refilled Advair inhaler prescription to have on hand for asthma management, especially during weather changes.  - Continued Singulair and provided a refill.  - Performed a physical exam and auscultated the patient's breathing.  - Confirmed the patient's asthma diagnosis.    OB/GYN REFERRAL:  - Referred the patient to Dr. River at [facility] for evaluation of menstrual-related symptoms.  - Acknowledged the severity of the patient's symptoms during ovulation, including severe pain and fatigue.    FOLLOW UP:  - Follow up in 6 months for regular checkup.  - Patient to bring practicum form to next appointment.  - Contact office to verify if additional labs are needed before next endocrinology appointment with Dr. Kee.         Plan:       Annual physical exam  Completed    FABIOLA (generalized anxiety disorder)  -     sertraline (ZOLOFT) 50 MG tablet; Take 1 tablet (50 mg total) by mouth once daily.  Dispense: 90 tablet; Refill: 1    Depressed mood  -     sertraline (ZOLOFT) 50 MG tablet; Take 1 tablet (50 mg total) by mouth once daily.  Dispense: 90 tablet; Refill: 1    Mild intermittent asthma with acute exacerbation  -     fluticasone-salmeterol diskus inhaler 100-50 mcg; Inhale 1 puff into the lungs 2 (two) times daily. Controller  Dispense: 60 each; Refill: 5  -     montelukast (SINGULAIR) 10  mg tablet; Take 1 tablet (10 mg total) by mouth once daily.  Dispense: 90 tablet; Refill: 3    Encounter for well woman exam with routine gynecological exam  -     Ambulatory referral/consult to Obstetrics / Gynecology; Future; Expected date: 02/03/2025    Ovulation pain  -     Ambulatory referral/consult to Obstetrics / Gynecology; Future; Expected date: 02/03/2025    Body mass index (BMI) 19.9 or less, adult  Healthy diet and exercise encouraged      Patient counseled on age appropriate medical preventative services, age appropriate cancer screenings, nutrition, healthy diet, consistent exercise regimen and maintaining an active lifestyle.      Counseled on age appropriate vaccines and discussed upcoming health care needs based on age/gender.  Spent time with patient counseling on need for a good patient/doctor relationship moving forward.  Discussed use of common OTC medications and supplements.  Discussed common dietary aids and use of caffeine and the need for good sleep hygiene and stress management.    Follow up in about 6 months (around 7/27/2025) for anxiety.        This note was generated with the assistance of ambient listening technology. Verbal consent was obtained by the patient and accompanying visitor(s) for the recording of patient appointment to facilitate this note. I attest to having reviewed and edited the generated note for accuracy, though some syntax or spelling errors may persist. Please contact the author of this note for any clarification.      1/27/2025 TERESA Isaac, GERONIMOC

## 2025-01-31 ENCOUNTER — PATIENT MESSAGE (OUTPATIENT)
Dept: FAMILY MEDICINE | Facility: CLINIC | Age: 40
End: 2025-01-31
Payer: OTHER GOVERNMENT

## 2025-01-31 DIAGNOSIS — R23.8 DRY SCALP: Primary | ICD-10-CM

## 2025-01-31 DIAGNOSIS — R21 RASH: ICD-10-CM

## 2025-02-05 ENCOUNTER — E-VISIT (OUTPATIENT)
Dept: FAMILY MEDICINE | Facility: CLINIC | Age: 40
End: 2025-02-05
Payer: OTHER GOVERNMENT

## 2025-02-05 DIAGNOSIS — B80 PINWORMS: Primary | ICD-10-CM

## 2025-02-06 ENCOUNTER — TELEPHONE (OUTPATIENT)
Dept: FAMILY MEDICINE | Facility: CLINIC | Age: 40
End: 2025-02-06
Payer: OTHER GOVERNMENT

## 2025-02-06 PROCEDURE — 99422 OL DIG E/M SVC 11-20 MIN: CPT | Mod: ,,, | Performed by: NURSE PRACTITIONER

## 2025-02-06 NOTE — TELEPHONE ENCOUNTER
----- Message from Rosemary sent at 2/6/2025 10:36 AM CST -----  - 9:44- pt needs to cancel her e visit request. She figured out how to treat it   [phone]

## 2025-02-06 NOTE — PROGRESS NOTES
Patient ID: Halley Mariee is a 39 y.o. female.    Chief Complaint: General Illness (Entered automatically based on patient selection in [employer].)    The patient initiated a request through [employer] on 2/5/2025 for evaluation and management with a chief complaint of General Illness (Entered automatically based on patient selection in [employer].)     I evaluated the questionnaire submission on 2/6/25.    [facility] Peq Evisit Supergroup-Common Problems    2/5/2025 10:45 PM CST - Filed by Patient   What do you need help with? Other Concern   Do you agree to participate in an E-Visit? Yes   If you have any of the following symptoms, please present to your local emergency room or call 911:  I acknowledge   Do you have any of the following pregnancy-related conditions? Breast feeding   What is the main issue you would like addressed today? Pinworms   Please describe your symptoms Anal itching at nihht and upon waking. I performed the tape test and can confirm pinworms.   Where is your problem located? Anus   How severe are your symptoms? Moderate   Have you had these symptoms before? No   How long have you been having these symptoms? Just today   Please list any medications or treatments you have used for your condition and indicate if it was effective or not. Nothing yet   What makes this feel better? Nothing   What makes this feel worse? Sitting still   Are these symptoms related to a condition that you currently have? No   Please describe any probable cause for these symptoms I have children   Provide any additional information you feel is important. I have 4 kids (3 are school aged).   Please attach any relevant images or files    Are you able to take your vital signs? No         Encounter Diagnosis   Name Primary?    Pinworms Yes        No orders of the defined types were placed in this encounter.     Medications Ordered This Encounter   Medications    pyrantel pamoate (MARTHA'S PINWORM MEDICINE) 50 mg/mL Susp      Sig: Take 13 mLs by mouth every 14 (fourteen) days. for 14 days     Dispense:  60 mL     Refill:  0     2 doses  by 14 days.        Follow up if symptoms worsen or fail to improve.      E-Visit Time Tracking:    Day 1 Time (in minutes): 11    Total Time (in minutes): 11

## 2025-02-25 ENCOUNTER — E-VISIT (OUTPATIENT)
Dept: FAMILY MEDICINE | Facility: CLINIC | Age: 40
End: 2025-02-25
Payer: OTHER GOVERNMENT

## 2025-02-25 DIAGNOSIS — R11.0 NAUSEA: Primary | ICD-10-CM

## 2025-02-25 DIAGNOSIS — N30.00 ACUTE CYSTITIS WITHOUT HEMATURIA: ICD-10-CM

## 2025-02-25 RX ORDER — ONDANSETRON 4 MG/1
4 TABLET, ORALLY DISINTEGRATING ORAL EVERY 6 HOURS PRN
Qty: 30 TABLET | Refills: 0 | Status: SHIPPED | OUTPATIENT
Start: 2025-02-25

## 2025-02-25 RX ORDER — NITROFURANTOIN 25; 75 MG/1; MG/1
100 CAPSULE ORAL 2 TIMES DAILY
Qty: 14 CAPSULE | Refills: 0 | Status: SHIPPED | OUTPATIENT
Start: 2025-02-25

## 2025-02-25 NOTE — PROGRESS NOTES
Patient ID: Halley Mariee is a 39 y.o. female.    Chief Complaint: General Illness (Entered automatically based on patient selection in [employer].)    The patient initiated a request through [employer] on 2/25/2025 for evaluation and management with a chief complaint of General Illness (Entered automatically based on patient selection in [employer].)     I evaluated the questionnaire submission on 2/25/25.    [facility] Peq Evisit [facility]group-Common Problems    2/25/2025  2:36 PM CST - Filed by Patient   What do you need help with? Urinary Symptoms   Do you agree to participate in an E-Visit? Yes   If you have any of the following symptoms, please present to your local emergency room or call 911:  I acknowledge   Do you have any of the following pregnancy-related conditions? Breast feeding   What is the main issue you would like addressed today? Burning, frequency   What symptoms do you currently have? Pain while passing urine;  Difficulty passing urine;  Change in urine appearance or smell   When did your symptoms first appear? 2/25/2025   List what you have done or taken to help your symptoms. Azo   Please indicate whether you have had the following symptoms during the past 24 hours     Urgent urination (a sudden and uncontrollable urge to urinate) Moderate   Frequent urination of small amounts of urine (going to the toilet very often) Moderate   Burning pain when urinating Moderate   Incomplete bladder emptying (still feel like you need to urinate again after urination) Moderate   Pain not associated with urination in the lower abdomen below the belly button) None   What does your urine look like? Cloudy   Blood seen in the urine None   Flank pain (pain in one or both sides of the lower back) None   Abnormal Vaginal Discharge (abnormal amount, color and/or odor) None   Discharge from the urethra (urinary opening) without urination None   High body temperature/fever? None-<99.5   Please rate how much discomfort you  have experience because of the symptoms in the past 24 hours: Moderate   Please indicate how the symptoms have interfered with your every day activities/work in the past 24 hours: Moderate   Please indicate how these symptoms have interfered with your social activities (visiting people, meeting with friends, etc.) in the past 24 hours? Moderate   Are you a diabetic? No   Please indicate whether you have the following at the time of completion of this questionnaire: None of the above   Provide any additional information you feel is important. I caught a stomach bug from my daughter and have been vomitting with diarrhea. I am dehydrated   Please attach any relevant images or files (if you have performed a home test for UTI, please submit a photo of results)    Are you able to take your vital signs? No         Encounter Diagnoses   Name Primary?    Nausea Yes    Acute cystitis without hematuria         No orders of the defined types were placed in this encounter.     Medications Ordered This Encounter   Medications    nitrofurantoin, macrocrystal-monohydrate, (MACROBID) 100 MG capsule     Sig: Take 1 capsule (100 mg total) by mouth 2 (two) times daily.     Dispense:  14 capsule     Refill:  0    ondansetron (ZOFRAN-ODT) 4 MG TbDL     Sig: Take 1 tablet (4 mg total) by mouth every 6 (six) hours as needed (nausea).     Dispense:  30 tablet     Refill:  0        Follow up if symptoms worsen or fail to improve in 3-5 days.      E-Visit Time Tracking:    Day 1 Time (in minutes): 11    Total Time (in minutes): 11

## 2025-02-27 ENCOUNTER — LAB VISIT (OUTPATIENT)
Dept: LAB | Facility: HOSPITAL | Age: 40
End: 2025-02-27
Attending: HOSPITALIST
Payer: OTHER GOVERNMENT

## 2025-02-27 DIAGNOSIS — E03.9 ACQUIRED HYPOTHYROIDISM: ICD-10-CM

## 2025-02-27 LAB
25(OH)D3+25(OH)D2 SERPL-MCNC: 40 NG/ML (ref 30–96)
ALBUMIN SERPL BCP-MCNC: 4.4 G/DL (ref 3.5–5.2)
ALP SERPL-CCNC: 74 U/L (ref 40–150)
ALT SERPL W/O P-5'-P-CCNC: 16 U/L (ref 10–44)
ANION GAP SERPL CALC-SCNC: 10 MMOL/L (ref 8–16)
AST SERPL-CCNC: 38 U/L (ref 10–40)
BILIRUB SERPL-MCNC: 0.4 MG/DL (ref 0.1–1)
BUN SERPL-MCNC: 14 MG/DL (ref 6–20)
CALCIUM SERPL-MCNC: 9.1 MG/DL (ref 8.7–10.5)
CHLORIDE SERPL-SCNC: 106 MMOL/L (ref 95–110)
CO2 SERPL-SCNC: 23 MMOL/L (ref 23–29)
CREAT SERPL-MCNC: 0.7 MG/DL (ref 0.5–1.4)
EST. GFR  (NO RACE VARIABLE): >60 ML/MIN/1.73 M^2
GLUCOSE SERPL-MCNC: 85 MG/DL (ref 70–110)
POTASSIUM SERPL-SCNC: 3.5 MMOL/L (ref 3.5–5.1)
PROT SERPL-MCNC: 7.3 G/DL (ref 6–8.4)
SODIUM SERPL-SCNC: 139 MMOL/L (ref 136–145)
T3 SERPL-MCNC: 54 NG/DL (ref 60–180)
T4 FREE SERPL-MCNC: 1.06 NG/DL (ref 0.71–1.51)
TSH SERPL DL<=0.005 MIU/L-ACNC: 4.41 UIU/ML (ref 0.4–4)

## 2025-02-27 PROCEDURE — 84439 ASSAY OF FREE THYROXINE: CPT | Performed by: HOSPITALIST

## 2025-02-27 PROCEDURE — 36415 COLL VENOUS BLD VENIPUNCTURE: CPT | Mod: PO | Performed by: HOSPITALIST

## 2025-02-27 PROCEDURE — 84445 ASSAY OF TSI GLOBULIN: CPT | Performed by: HOSPITALIST

## 2025-02-27 PROCEDURE — 86376 MICROSOMAL ANTIBODY EACH: CPT | Performed by: HOSPITALIST

## 2025-02-27 PROCEDURE — 80053 COMPREHEN METABOLIC PANEL: CPT | Performed by: HOSPITALIST

## 2025-02-27 PROCEDURE — 82306 VITAMIN D 25 HYDROXY: CPT | Performed by: HOSPITALIST

## 2025-02-27 PROCEDURE — 84480 ASSAY TRIIODOTHYRONINE (T3): CPT | Performed by: HOSPITALIST

## 2025-02-27 PROCEDURE — 84443 ASSAY THYROID STIM HORMONE: CPT | Performed by: HOSPITALIST

## 2025-02-28 LAB — THYROPEROXIDASE IGG SERPL-ACNC: <6 IU/ML

## 2025-03-03 LAB — TSI SER-ACNC: <0.1 IU/L

## 2025-03-11 ENCOUNTER — OFFICE VISIT (OUTPATIENT)
Dept: ENDOCRINOLOGY | Facility: CLINIC | Age: 40
End: 2025-03-11
Payer: OTHER GOVERNMENT

## 2025-03-11 ENCOUNTER — PATIENT MESSAGE (OUTPATIENT)
Dept: ENDOCRINOLOGY | Facility: CLINIC | Age: 40
End: 2025-03-11

## 2025-03-11 DIAGNOSIS — E05.00 GRAVES DISEASE: ICD-10-CM

## 2025-03-11 DIAGNOSIS — E03.9 ACQUIRED HYPOTHYROIDISM: Primary | ICD-10-CM

## 2025-03-11 PROCEDURE — 98006 SYNCH AUDIO-VIDEO EST MOD 30: CPT | Mod: 95,,, | Performed by: HOSPITALIST

## 2025-03-11 RX ORDER — LEVOTHYROXINE SODIUM 137 UG/1
137 TABLET ORAL
Qty: 90 TABLET | Refills: 3 | Status: SHIPPED | OUTPATIENT
Start: 2025-03-11

## 2025-03-11 NOTE — ASSESSMENT & PLAN NOTE
- Hypothyroidism due to autoimmune thyroiditis:  Reportedly Graves status post BATISTA at age 11  - I reviewed lab work trends: TSH, Free T4, with patient in clinic today 3/11/2025   - Discussed and confirmed the proper way of taking Synthroid: daily on an empty stomach, waiting 30 minutes before any other medication. The patient verbalized understanding.  - INCREASE with NAME BRAND: Synthroid 150 mcg daily, 7 days a week.  No more skipping  - Trend lab work TSH, FT4, and check TPO every 4-6 months> follow-up with endocrinology to adjust medication  - Once TSH/T4 in normal range, patient should continue refills with their PCP Keisha Shaw FNP-C, given chronic nature of hypothyroidism    - no plans for future pregnancy

## 2025-03-11 NOTE — PROGRESS NOTES
The patient location is: [place], [place]  The chief complaint leading to consultation is:  Hypothyroidism    Visit type: audiovisual    Face to Face time with patient: 10 mins    30 minutes of total time spent on the encounter, which includes face to face time and non-face to face time preparing to see the patient (eg, review of tests), Obtaining and/or reviewing separately obtained history, Documenting clinical information in the electronic or other health record, Independently interpreting results (not separately reported) and communicating results to the patient/family/caregiver, or Care coordination (not separately reported).     Each patient to whom he or she provides medical services by telemedicine is:  (1) informed of the relationship between the physician and patient and the respective role of any other health care provider with respect to management of the patient; and (2) notified that he or she may decline to receive medical services by telemedicine and may withdraw from such care at any time.    Subjective:      Patient ID: Halley Mariee is a 39 y.o. female presented to [facility] on 3/11/2025.  Chief complaint:  No chief complaint on file.      History of Present illness: Halley Mariee is a 39 y.o. female here for  Postablative hypothyroidism  No other significant past medical history  Current PCP Keisha Shaw FNP-C    Interval history: Here for follow-up hypothyroidism: Reports compliance with Synthroid 6 days a week   Increase in fatigue, TSH mildly increased at 4.4 with low total T3  Does have lactose intolerance  Reports her menstrual cycle has returned now      Hypothyroidism  - Diagnosed  in: age 11, GRAVES treated at that time with Postablative radioactive iodide ablation  - Patient is is in the , in the past was managed by endocrinology.  Now managed by PCP   - Currently on Synthroid Dose was decreased over the last few months due to  "suppressed TSH in symptoms of hyperthyroidism   - Family history thyroid disorder: yes, dad with hypothyroidism and twin sister with Hashimoto  - Family history of thyroid cancer: no  - Hx of Thyroid goiter: no  - Tobacco user: no  - No plans for future pregnancy, 4 child  - Recommend avoid the use of [place] thyroid, discussed with patient, given history of lactose intolerance in absorption    Currently on Synthroid 150 mcg 6 days a week, skip SUNDAY. Avg 128 mcg/d  - Takes thyroid medication properly without food first thing in the morning, yes    Current symptoms:   No   Yes  [x]    []   Weight gain  []    [x]   Fatigue>> improved  [x]    []   Constipation  [x]    []   Hair loss  [x]    []   Brittle nails  [x]    []   Mental fog  [x]    []   Cold intolerance  [x]    []   Recent severe illness  [x]    []   Neck swelling, pain, pressure  [x]    []   Hoarseness    Medications:  [x]    []   Lithium Use  [x]    []   Amiodarone use  [x]    []   NSAIDs/systemic steroid  [x]    []   Chemotherapy   [x]    []   Radiation Treatment  []    []   Estrogen therapy  []    []   Supplements:  That includes:  High dose iodine: Including "thyroid support", Kelp, Iodine drops, [place], Bladder wrack  []    []   Other Supplements: Biotin, Ashwagandha, selenium    Reproductive Issue:  []    []   Galactorrhea  [x]    []   Infertility/abnormal menstruation  [x]    []   Recent pregnancy  [x]    []   Decreased libido, erectile dysfunction    Thyroid lab work  Lab Results   Component Value Date    TSH 4.409 (H) 02/27/2025    TSH 2.690 01/24/2025    TSH 0.363 (L) 08/23/2024    T4FREE 1.15 01/24/2025    T4FREE 1.66 08/23/2024    T4FREE 1.39 05/06/2024    FREET4 1.06 02/27/2025    FREET4 1.22 01/11/2024    Q5CGQOJ 54 (L) 02/27/2025      Antibodies  Lab Results   Component Value Date    THYROPEROXID <6.0 02/27/2025    THYROIDSTIMI <0.10 02/27/2025     The patient's medications, allergies, past medical, surgical, social and family histories were " "reviewed and updated as appropriate.  Review of Systems: pertinent positives as per the above HPI, and otherwise negative.    Objective:   There were no vitals taken for this visit.  There is no height or weight on file to calculate BMI.  Vital signs reviewed    Physical Exam  Constitutional:       Comments: Due to the virtual nature of this visit, a physical exam and vital signs were not obtained.       Labs reviewed:  See results in subjective  No results found for: "HGBA1C"  Lab Results   Component Value Date    CHOL 165 05/06/2024    HDL 53 05/06/2024    LDLCALC 101 (H) 05/06/2024    TRIG 57 05/06/2024     Lab Results   Component Value Date     02/27/2025    K 3.5 02/27/2025     02/27/2025    CO2 23 02/27/2025    GLU 85 02/27/2025    BUN 14 02/27/2025    CREATININE 0.7 02/27/2025    CALCIUM 9.1 02/27/2025    PROT 7.3 02/27/2025    ALBUMIN 4.4 02/27/2025    BILITOT 0.4 02/27/2025    ALKPHOS 74 02/27/2025    AST 38 02/27/2025    ALT 16 02/27/2025    ANIONGAP 10 02/27/2025    EGFRNORACEVR >60.0 02/27/2025    TSH 4.409 (H) 02/27/2025    HPFWQWOB07VL 40 02/27/2025     Assessment     1. Acquired hypothyroidism  SYNTHROID 137 mcg Tab tablet    TSH    T4, Free    T3      2. Graves disease          Plan     Acquired hypothyroidism  - Hypothyroidism due to autoimmune thyroiditis:  Reportedly Graves status post BATISTA at age 11  - I reviewed lab work trends: TSH, Free T4, with patient in clinic today 3/11/2025   - Discussed and confirmed the proper way of taking Synthroid: daily on an empty stomach, waiting 30 minutes before any other medication. The patient verbalized understanding.  - INCREASE with NAME BRAND: Synthroid 150 mcg daily, 7 days a week.  No more skipping  - Trend lab work TSH, FT4, and check TPO every 4-6 months> follow-up with endocrinology to adjust medication  - Once TSH/T4 in normal range, patient should continue refills with their PCP Keisha Shaw FNP-C, given chronic nature of " hypothyroidism    - no plans for future pregnancy    Graves disease  - Reports history of BATISTA at age 11 for Graves  - Negative thyroid antibody  - Recommended a gluten-free or gluten-avoidance diet due to its potential association with autoimmune conditions of the small intestine.  - Recommended over-the-counter Vitamin D3 supplements for immune support.    Advised patient to follow up with PCP for routine health maintenance care.   RTC in 4-6 months, virtual visit    Mark Kee M.D.  Endocrinology  [facility]  3/11/2025      Disclaimer: This note has been generated in part with the use of voice-recognition software. There may be typographical errors that have been missed during proof-reading.

## 2025-03-11 NOTE — ASSESSMENT & PLAN NOTE
- Reports history of BATISTA at age 11 for Graves  - Negative thyroid antibody  - Recommended a gluten-free or gluten-avoidance diet due to its potential association with autoimmune conditions of the small intestine.  - Recommended over-the-counter Vitamin D3 supplements for immune support.

## 2025-03-17 ENCOUNTER — PATIENT MESSAGE (OUTPATIENT)
Dept: ENDOCRINOLOGY | Facility: CLINIC | Age: 40
End: 2025-03-17
Payer: OTHER GOVERNMENT

## 2025-04-25 ENCOUNTER — TELEPHONE (OUTPATIENT)
Dept: DERMATOLOGY | Facility: CLINIC | Age: 40
End: 2025-04-25
Payer: OTHER GOVERNMENT

## 2025-04-25 NOTE — TELEPHONE ENCOUNTER
Patient scheduled.     ----- Message from Charli sent at 4/25/2025 10:29 AM CDT -----  Type:  Sooner Apoointment RequestCaller is requesting a sooner appointment.   Name of Caller:Denis is the first available appointment?sooner apptSymptoms:f/uWould the patient rather a call back or a response via [facility]? Call [facility] Call Back Number:[phone] Additional Information: pt st she would like to est care with dr. Hammer. She no longer wants to see the other derm. Pt st can someone give her a call to get her eun with dr. Hammer. Please call to discuss.

## 2025-05-28 ENCOUNTER — LAB VISIT (OUTPATIENT)
Dept: LAB | Facility: HOSPITAL | Age: 40
End: 2025-05-28
Attending: NURSE PRACTITIONER
Payer: OTHER GOVERNMENT

## 2025-05-28 ENCOUNTER — OFFICE VISIT (OUTPATIENT)
Dept: FAMILY MEDICINE | Facility: CLINIC | Age: 40
End: 2025-05-28
Payer: OTHER GOVERNMENT

## 2025-05-28 VITALS
DIASTOLIC BLOOD PRESSURE: 60 MMHG | SYSTOLIC BLOOD PRESSURE: 100 MMHG | OXYGEN SATURATION: 99 % | WEIGHT: 131.19 LBS | HEART RATE: 61 BPM | BODY MASS INDEX: 19.43 KG/M2 | HEIGHT: 69 IN | TEMPERATURE: 98 F

## 2025-05-28 DIAGNOSIS — Z13.89 ENCOUNTER FOR SURVEILLANCE OF ABNORMAL NEVI: Primary | ICD-10-CM

## 2025-05-28 DIAGNOSIS — Z11.1 SCREENING-PULMONARY TB: ICD-10-CM

## 2025-05-28 DIAGNOSIS — R45.89 DEPRESSED MOOD: ICD-10-CM

## 2025-05-28 DIAGNOSIS — Z02.89 ENCOUNTER FOR COMPLETION OF FORM WITH PATIENT: ICD-10-CM

## 2025-05-28 DIAGNOSIS — F41.1 GAD (GENERALIZED ANXIETY DISORDER): ICD-10-CM

## 2025-05-28 PROCEDURE — 86480 TB TEST CELL IMMUN MEASURE: CPT

## 2025-05-28 PROCEDURE — 99999 PR PBB SHADOW E&M-EST. PATIENT-LVL IV: CPT | Mod: PBBFAC,,, | Performed by: NURSE PRACTITIONER

## 2025-05-28 PROCEDURE — 36415 COLL VENOUS BLD VENIPUNCTURE: CPT | Mod: PN

## 2025-05-28 PROCEDURE — 99214 OFFICE O/P EST MOD 30 MIN: CPT | Mod: PBBFAC,PN | Performed by: NURSE PRACTITIONER

## 2025-05-28 RX ORDER — SERTRALINE HYDROCHLORIDE 50 MG/1
50 TABLET, FILM COATED ORAL DAILY
Qty: 90 TABLET | Refills: 1 | Status: SHIPPED | OUTPATIENT
Start: 2025-05-28

## 2025-05-28 NOTE — PROGRESS NOTES
Subjective:       Patient ID: Halley Mariee is a 39 y.o. female.    Chief Complaint: growth on right leg    History of Present Illness    CHIEF COMPLAINT:  Patient presents with concern about a rapidly changing spot on her right leg that appeared about a month ago.    HPI:  Patient reports a spot on her right leg that appeared approximately 1 month ago. The spot was initially smaller (about half its current size), smooth, and pink, but started developing texture within a week. It is growing and changing rapidly, with its appearance differing every week. Sometimes there is more scaling, and occasionally it bleeds.    She expresses concern about potential malignancy due to its rapid changes. She mentions a history of frequent tanning bed use for 10-15 years when she was younger, acknowledging it as a potential risk factor.    She had initially scheduled an appointment with dermatologists Dr. Oates and Dr. Hammer, but is now seeking a referral to Dr. Weil at [facility] for a quicker evaluation due to long wait times.    MEDICATIONS:  Patient is on Zoloft and uses Advair as needed. She also has Flonase at home but is not currently using it.    MEDICAL HISTORY:  Patient has a history of blepharitis. She also has a history of tanning bed use for 10-15 years, with daily use when she was younger.    TEST RESULTS:  Patient underwent labs in February.    ALLERGIES:  Patient experiences seasonal allergies, which manifest as ear congestion (more pronounced in the right ear than the left) and eye redness.      ROS:  General: -fever, -chills, -fatigue, -weight gain, -weight loss  Eyes: -vision changes, +redness, -discharge  ENT: -ear pain, -nasal congestion, -sore throat, +allergic reactions  Cardiovascular: -chest pain, -palpitations, -lower extremity edema  Respiratory: -cough, -shortness of breath  Gastrointestinal: -abdominal pain, -nausea, -vomiting, -diarrhea, -constipation, -blood in  stool  Genitourinary: -dysuria, -hematuria, -frequency  Musculoskeletal: -joint pain, -muscle pain  Skin: -rash, +lesion, +skin texture changes  Neurological: -headache, -dizziness, -numbness, -tingling  Psychiatric: +anxiety (controlled), -depression, -sleep difficulty         Past Medical History:   Diagnosis Date    Asthma     Depression affecting pregnancy, postpartum     Graves disease         Past Surgical History:   Procedure Laterality Date    APPENDECTOMY      LAPAROSCOPIC APPENDECTOMY Right        Family History   Problem Relation Name Age of Onset    Hypertension Mother Resolved     Diabetes Mother Resolved     Hypothyroidism Father      Vitiligo Father      Hashimoto's thyroiditis Sister Marlin     Asthma Sister Marlin     Depression Sister Marlin     Epilepsy Brother      Hypertension Maternal Grandmother Mawmaw     Alzheimer's disease Maternal Grandmother Mawmaw     Hyperlipidemia Maternal Grandmother Mawmaw     Melanoma Maternal Grandmother Mawmaw     Hypertension Maternal Grandfather Pawpaw         passed away lung cancer    Cancer Maternal Grandfather Pawpaw     Heart disease Maternal Grandfather Pawpaw     Cirrhosis Paternal Grandmother      Asthma Daughter Prim         1 daugther       Social History     Socioeconomic History    Marital status:     Number of children: 4   Tobacco Use    Smoking status: Never    Smokeless tobacco: Never   Substance and Sexual Activity    Alcohol use: Not Currently    Drug use: Not Currently    Sexual activity: Yes     Partners: Male     Social Drivers of Health     Financial Resource Strain: Low Risk  (2/21/2024)    Overall Financial Resource Strain (CARDIA)     Difficulty of Paying Living Expenses: Not hard at all   Food Insecurity: Not on File (9/26/2024)    Received from [email or web address]    Food Insecurity     Food: 0   Transportation Needs: No Transportation Needs (2/21/2024)    PRAPARE - Transportation     Lack of Transportation (Medical): No     Lack of  "Transportation (Non-Medical): No   Physical Activity: Insufficiently Active (2/21/2024)    Exercise Vital Sign     Days of Exercise per Week: 3 days     Minutes of Exercise per Session: 10 min   Stress: No Stress Concern Present (2/21/2024)    [place] [employer] - Occupational Stress Questionnaire     Feeling of Stress : Not at all   Housing Stability: Low Risk  (2/21/2024)    Housing Stability Vital Sign     Unable to Pay for Housing in the Last Year: No     Number of Places Lived in the Last Year: 2     Unstable Housing in the Last Year: No       Current Medications[1]    Review of patient's allergies indicates:   Allergen Reactions    Animal dander Itching, Rash and Shortness Of Breath    Bee pollen      Wheezing   Trigger asthma    Chaste tree extract      Wheezing   Trigger asthma    Grass pollen-red top, standard      Wheezing   Trigger asthma    Mite extract      Wheezing   Trigger asthma    Mold      Wheezing   Trigger asthma     Objective:      Blood pressure 100/60, pulse 61, temperature 98.1 °F (36.7 °C), height 5' 9" (1.753 m), weight 59.5 kg (131 lb 2.8 oz), last menstrual period 05/21/2025, SpO2 99%, unknown if currently breastfeeding. Body mass index is 19.37 kg/m².   Physical Exam    General: No acute distress. Well-developed. Well-nourished.  Eyes: EOMI. Sclerae anicteric.  HENT: Normocephalic. Atraumatic. Nares patent. Moist oral mucosa.  Ears: Bilateral TMs clear. Bilateral EACs clear.  Cardiovascular: Regular rate. Regular rhythm. No murmurs. No rubs. No gallops. Normal S1, S2.  Respiratory: Normal respiratory effort. Clear to auscultation bilaterally. No rales. No rhonchi. No wheezing.  Abdomen: Soft. Non-tender. Non-distended. Normoactive bowel sounds.  Musculoskeletal: No  obvious deformity.  Extremities: No lower extremity edema.  Neurological: Alert & oriented x3. No slurred speech. Normal gait.  Psychiatric: Normal mood. Normal affect. Good insight. Good " judgment.  Skin: Warm. Scaly patching on side of lesion of the right lower leg. No rash. Spot in the middle of the lesion on right lower leg.               Assessment:       Assessment & Plan    - Evaluated rapidly changing skin lesion on right lower leg, noting scaling and central spot.  - Considered possibility of keratosis but recommended dermatology evaluation due to growth and changes.  - Assessed for allergy symptoms, noting more prominent in right ear.  - Reviewed history of eye redness and previous treatments.    SKIN LESION:  - Examined lesion on right lower leg that appeared about a month ago.  - Initially small, smooth, and pink, it has since become textured, grown in size, and sometimes bleeds.  - Lesion is round, symmetrical, with scaling around the right side and a spot in the middle.  - Referred patient to Dr. Weil at [facility] for evaluation of this changing skin lesion.  - Instructed patient to contact our office if no response is received from Dr. Weil's office regarding dermatology appointment.    ALLERGIC RHINITIS:  - Patient reports allergy symptoms affecting nasal passages.  - Instructed to use Flonase nasal spray daily for at least 2 weeks to address nasal symptoms.  - Continued Advair for as-needed use.    ALLERGIC CONJUNCTIVITIS:  - Patient's eyes are always red and was previously prescribed steroid eye drops for 6 weeks.  - Educated that Flonase is the only nasal steroid that helps with eye allergies and recommended daily use for at least 2 weeks to address eye symptoms.    CHRONIC OTITIS MEDIA:  - Patient reports more allergy symptoms in the right ear than the left.  - Recommend Flonase nasal spray daily for at least 2 weeks to address ear symptoms as well.    OTHER MANAGEMENT:  - Continued Zoloft.  - Ordered QuantiFERON-TB Gold blood test for practicums.  - Provider will scan and fill out paperwork once TB test results are available, then provide a copy or return originals to  patient.       Plan:       Halley was seen today for growth on right leg.    Diagnoses and all orders for this visit:    Encounter for surveillance of abnormal nevi  -     Ambulatory referral/consult to Dermatology; Future    FABIOLA (generalized anxiety disorder)  -     sertraline (ZOLOFT) 50 MG tablet; Take 1 tablet (50 mg total) by mouth once daily.    Depressed mood  -     sertraline (ZOLOFT) 50 MG tablet; Take 1 tablet (50 mg total) by mouth once daily.    Screening-pulmonary TB  -     Quantiferon Gold TB; Future    Encounter for completion of form with patient  Completed and scanned into the chart.          This note was generated with the assistance of ambient listening technology. Verbal consent was obtained by the patient and accompanying visitor(s) for the recording of patient appointment to facilitate this note. I attest to having reviewed and edited the generated note for accuracy, though some syntax or spelling errors may persist. Please contact the author of this note for any clarification.    I spent a total of 30 minutes on the day of the visit.  This includes face to face time and non-face to face time preparing to see the patient (eg, review of tests), obtaining and/or reviewing separately obtained history, documenting clinical information in the electronic or other health record, independently interpreting results and communicating results to the patient/family/caregiver, or care coordinator.          [1]   Current Outpatient Medications   Medication Sig Dispense Refill    albuterol (PROVENTIL/VENTOLIN HFA) 90 mcg/actuation inhaler Inhale 2 puffs into the lungs every 6 (six) hours as needed for Wheezing or Shortness of Breath. 18 g 5    cetirizine 10 mg Cap Take by mouth.      fluticasone-salmeterol diskus inhaler 100-50 mcg Inhale 1 puff into the lungs 2 (two) times daily. Controller 60 each 5    montelukast (SINGULAIR) 10 mg tablet Take 1 tablet (10 mg total) by mouth once daily. 90 tablet 3     prenatal vit no.124/iron/folic (PRENATAL VITAMIN ORAL) Take by mouth Daily.      SYNTHROID 137 mcg Tab tablet Take 1 tablet (137 mcg total) by mouth before breakfast. 90 tablet 3    sertraline (ZOLOFT) 50 MG tablet Take 1 tablet (50 mg total) by mouth once daily. 90 tablet 1     No current facility-administered medications for this visit.

## 2025-05-30 LAB
MITOGEN MINUS NIL (OHS): 6.33
NIL TB SYNCED (OHS): 0.01
QUANTIFERON GOLD INTERP (OHS): NEGATIVE
TB1 AG MINUS NIL (OHS): 0.03
TB2 AG MINUS NIL (OHS): 0.01

## 2025-06-03 ENCOUNTER — PATIENT MESSAGE (OUTPATIENT)
Dept: FAMILY MEDICINE | Facility: CLINIC | Age: 40
End: 2025-06-03
Payer: OTHER GOVERNMENT

## 2025-07-07 ENCOUNTER — TELEPHONE (OUTPATIENT)
Dept: FAMILY MEDICINE | Facility: CLINIC | Age: 40
End: 2025-07-07
Payer: OTHER GOVERNMENT

## 2025-07-07 NOTE — TELEPHONE ENCOUNTER
----- Message from Marilee sent at 7/7/2025  1:14 PM CDT -----  Regarding: immunization records  Patient is needing her immunization records sent through to her chart. She doesn't know if she already had them sent over? She mentioned having to fill out a form every time she travels for  to get all of her records transferred over and she is needing her immunizations sent

## 2025-07-29 ENCOUNTER — OFFICE VISIT (OUTPATIENT)
Dept: FAMILY MEDICINE | Facility: CLINIC | Age: 40
End: 2025-07-29
Payer: OTHER GOVERNMENT

## 2025-07-29 VITALS
WEIGHT: 130.94 LBS | SYSTOLIC BLOOD PRESSURE: 110 MMHG | HEART RATE: 60 BPM | TEMPERATURE: 99 F | BODY MASS INDEX: 19.39 KG/M2 | DIASTOLIC BLOOD PRESSURE: 60 MMHG | OXYGEN SATURATION: 99 % | HEIGHT: 69 IN

## 2025-07-29 DIAGNOSIS — Z23 NEED FOR PNEUMOCOCCAL 20-VALENT CONJUGATE VACCINATION: ICD-10-CM

## 2025-07-29 DIAGNOSIS — F41.1 GAD (GENERALIZED ANXIETY DISORDER): Primary | ICD-10-CM

## 2025-07-29 DIAGNOSIS — Z12.83 SKIN CANCER SCREENING: ICD-10-CM

## 2025-07-29 DIAGNOSIS — Z01.84 IMMUNITY STATUS TESTING: ICD-10-CM

## 2025-07-29 DIAGNOSIS — R45.89 DEPRESSED MOOD: ICD-10-CM

## 2025-07-29 PROCEDURE — 90677 PCV20 VACCINE IM: CPT | Mod: PBBFAC,PN

## 2025-07-29 PROCEDURE — 99214 OFFICE O/P EST MOD 30 MIN: CPT | Mod: S$PBB,,, | Performed by: NURSE PRACTITIONER

## 2025-07-29 PROCEDURE — 99999PBSHW PR PBB SHADOW TECHNICAL ONLY FILED TO HB: Mod: PBBFAC,,,

## 2025-07-29 PROCEDURE — G0009 ADMIN PNEUMOCOCCAL VACCINE: HCPCS | Mod: PBBFAC,PN

## 2025-07-29 PROCEDURE — 99999 PR PBB SHADOW E&M-EST. PATIENT-LVL IV: CPT | Mod: PBBFAC,,, | Performed by: NURSE PRACTITIONER

## 2025-07-29 PROCEDURE — 99214 OFFICE O/P EST MOD 30 MIN: CPT | Mod: PBBFAC,PN | Performed by: NURSE PRACTITIONER

## 2025-07-29 RX ADMIN — PNEUMOCOCCAL 20-VALENT CONJUGATE VACCINE 0.5 ML
2.2; 2.2; 2.2; 2.2; 2.2; 2.2; 2.2; 2.2; 2.2; 2.2; 2.2; 2.2; 2.2; 2.2; 2.2; 2.2; 4.4; 2.2; 2.2; 2.2 INJECTION, SUSPENSION INTRAMUSCULAR at 10:07

## 2025-07-29 NOTE — PROGRESS NOTES
Subjective:       Patient ID: Halley Mariee is a 39 y.o. female.    Chief Complaint: Thyroid Problem and Mood    History of Present Illness    CHIEF COMPLAINT:  Patient presents for a follow-up visit to discuss various health concerns and update referrals.    HPI:  Patient reports a skin lesion on her right lower leg present for about a month. It has flattened and now appears as a small nodule with a scar-like appearance. The lesion initially had characteristics of a wart, including red and black dots, and a crusty texture. Her children have molluscum, but she did not observe the typical dimple in the middle of her own lesion.    She has asthma but rarely uses her Albuterol inhaler and has not refilled her maintenance inhaler since January. Her asthma symptoms are mainly affected by seasonal changes, particularly in fall and spring.    She reports Zoloft 50mg is still effective. She is under the care of an endocrinologist for thyroid issues and has an upcoming appointment within the next two weeks.    She needs titers for varicella, MMR, and Hepatitis B for school purposes. She has immunization records with dates but requires current titer results.    She denies getting sick often.    MEDICATIONS:  Patient is on Zoloft 50 mg. She uses an Albuterol inhaler as needed for asthma, along with a maintenance inhaler for asthma management.    MEDICAL HISTORY:  Patient has a history of asthma and a thyroid condition. Her last Pap smear was in 2020 with normal results. She denies the possibility of pregnancy.     TEST RESULTS:  Patient had a Pap smear with normal results, though the exact date is unknown.    SOCIAL HISTORY:  Occupation: Student      ROS:  General: -fever, -chills, -fatigue, -weight gain, -weight loss  Eyes: -vision changes, -redness, -discharge  ENT: -ear pain, -nasal congestion, -sore throat  Cardiovascular: -chest pain, -palpitations, -lower extremity edema  Respiratory: -cough, -shortness of  breath  Gastrointestinal: -abdominal pain, -nausea, -vomiting, -diarrhea, -constipation, -blood in stool  Genitourinary: -dysuria, -hematuria, -frequency  Musculoskeletal: -joint pain, -muscle pain  Skin: -rash, +lesion, +lumps/masses  Neurological: -headache, -dizziness, -numbness, -tingling  Psychiatric: -anxiety, -depression, -sleep difficulty         Past Medical History:   Diagnosis Date    Asthma     Depression affecting pregnancy, postpartum     Graves disease         Past Surgical History:   Procedure Laterality Date    APPENDECTOMY      LAPAROSCOPIC APPENDECTOMY Right        Family History   Problem Relation Name Age of Onset    Hypertension Mother Resolved     Diabetes Mother Resolved     Hypothyroidism Father      Vitiligo Father      Hashimoto's thyroiditis Sister Marlin     Asthma Sister Marlin     Depression Sister Marlin     Epilepsy Brother      Hypertension Maternal Grandmother Mawmaw     Alzheimer's disease Maternal Grandmother Mawmaw     Hyperlipidemia Maternal Grandmother Mawmaw     Melanoma Maternal Grandmother Mawmaw     Hypertension Maternal Grandfather Pawpaw         passed away lung cancer    Cancer Maternal Grandfather Pawpaw     Heart disease Maternal Grandfather Pawpaw     Cirrhosis Paternal Grandmother      Asthma Daughter Prim         1 daugther       Social History     Socioeconomic History    Marital status:     Number of children: 4   Tobacco Use    Smoking status: Never    Smokeless tobacco: Never   Substance and Sexual Activity    Alcohol use: Not Currently    Drug use: Not Currently    Sexual activity: Yes     Partners: Male     Social Drivers of Health     Financial Resource Strain: Low Risk  (2/21/2024)    Overall Financial Resource Strain (CARDIA)     Difficulty of Paying Living Expenses: Not hard at all   Food Insecurity: Not on File (9/26/2024)    Received from "[facility]    Food Insecurity     Food: 0   Transportation Needs: No Transportation Needs (2/21/2024)     "PRAPARE - Transportation     Lack of Transportation (Medical): No     Lack of Transportation (Non-Medical): No   Physical Activity: Insufficiently Active (2/21/2024)    Exercise Vital Sign     Days of Exercise per Week: 3 days     Minutes of Exercise per Session: 10 min   Stress: No Stress Concern Present (2/21/2024)    [place] citizen [employer] - Occupational Stress Questionnaire     Feeling of Stress : Not at all   Housing Stability: Low Risk  (2/21/2024)    Housing Stability Vital Sign     Unable to Pay for Housing in the Last Year: No     Number of Places Lived in the Last Year: 2     Unstable Housing in the Last Year: No       Current Medications[1]    Review of patient's allergies indicates:   Allergen Reactions    Animal dander Itching, Rash and Shortness Of Breath    Bee pollen      Wheezing   Trigger asthma    Chaste tree extract      Wheezing   Trigger asthma    Grass pollen-red top, standard      Wheezing   Trigger asthma    Mite extract      Wheezing   Trigger asthma    Mold      Wheezing   Trigger asthma     Objective:      Blood pressure 110/60, pulse 60, temperature 98.7 °F (37.1 °C), height 5' 9" (1.753 m), weight 59.4 kg (130 lb 15.3 oz), last menstrual period 07/08/2025, SpO2 99%, unknown if currently breastfeeding. Body mass index is 19.34 kg/m².   Physical Exam    General: No acute distress. Well-developed. Well-nourished.  Eyes: EOMI. Sclerae anicteric.  HENT: Normocephalic. Atraumatic. Nares patent. Moist oral mucosa.  Cardiovascular: Regular rate. Regular rhythm. No murmurs. No rubs. No gallops. Normal S1, S2.  Respiratory: Normal respiratory effort. Clear to auscultation bilaterally. No rales. No rhonchi. No wheezing.  Abdomen: Soft. Non-distended.   Musculoskeletal: No  obvious deformity.  Extremities: No lower extremity edema.  Neurological: Alert & oriented x3. No slurred speech. Normal gait.  Psychiatric: Normal mood. Normal affect. Good insight. Good judgment.  Skin: Warm. " Dry. No rash.         Assessment:       Assessment & Plan    - Leg lesion has flattened and appears to be resolving.  - Asthma well-controlled without daily maintenance inhaler use. Discussed asthma management, noting that maintenance inhaler use may be beneficial during seasonal changes if symptoms increase.  - Zoloft 50 mg effective at current dosage.    ASTHMA MANAGEMENT:  - Patient's breathing status is currently stable but affected by seasonal changes, with rare use of Albuterol inhaler.  - Recommend initiation of daily maintenance inhaler therapy to better control asthma symptoms.    VACCINATIONS:  - Ordered Prevnar 20 vaccination to be administered in office due to patient's asthma diagnosis.  - This vaccine protects against 20 strains of pneumococcal pneumonia.  - Also ordered titers for rubella, rubeola, mumps, hepatitis B, and varicella.    REFERRALS:  - Updated dermatology referral for full body skin check.    FOLLOW-UP:  - Follow up in 6 months for annual visit.  - Patient to contact the office for refill needs and referrals.       Plan:       Halley was seen today for thyroid problem and mood.    Diagnoses and all orders for this visit:    FABIOLA (generalized anxiety disorder)  Stable  Continue zoloft    Depressed mood  Stable  Continue zoloft    Skin cancer screening  -     Ambulatory referral/consult to Dermatology; Future    Immunity status testing  -     Rubella antibody, IgG; Future  -     Rubeola antibody IgG; Future  -     Mumps, IgG Screen; Future  -     Hepatitis B Surface Antibody, Qual/Quant; Future  -     Varicella zoster antibody, IgG; Future    Need for pneumococcal 20-valent conjugate vaccination  -     pneumoc 20-naomy conj-dip cr(PF) (PREVNAR-20 (PF)) injection Syrg 0.5 mL           This note was generated with the assistance of ambient listening technology. Verbal consent was obtained by the patient and accompanying visitor(s) for the recording of patient appointment to facilitate this  note. I attest to having reviewed and edited the generated note for accuracy, though some syntax or spelling errors may persist. Please contact the author of this note for any clarification.             [1]   Current Outpatient Medications   Medication Sig Dispense Refill    albuterol (PROVENTIL/VENTOLIN HFA) 90 mcg/actuation inhaler Inhale 2 puffs into the lungs every 6 (six) hours as needed for Wheezing or Shortness of Breath. 18 g 5    cetirizine 10 mg Cap Take by mouth.      fluticasone-salmeterol diskus inhaler 100-50 mcg Inhale 1 puff into the lungs 2 (two) times daily. Controller 60 each 5    montelukast (SINGULAIR) 10 mg tablet Take 1 tablet (10 mg total) by mouth once daily. 90 tablet 3    prenatal vit no.124/iron/folic (PRENATAL VITAMIN ORAL) Take by mouth Daily.      sertraline (ZOLOFT) 50 MG tablet Take 1 tablet (50 mg total) by mouth once daily. 90 tablet 1    SYNTHROID 137 mcg Tab tablet Take 1 tablet (137 mcg total) by mouth before breakfast. 90 tablet 3     No current facility-administered medications for this visit.

## 2025-08-01 ENCOUNTER — LAB VISIT (OUTPATIENT)
Dept: LAB | Facility: HOSPITAL | Age: 40
End: 2025-08-01
Attending: HOSPITALIST
Payer: OTHER GOVERNMENT

## 2025-08-01 DIAGNOSIS — Z01.84 IMMUNITY STATUS TESTING: ICD-10-CM

## 2025-08-01 DIAGNOSIS — E03.9 ACQUIRED HYPOTHYROIDISM: ICD-10-CM

## 2025-08-01 LAB
T3FREE SERPL-MCNC: 59 NG/DL (ref 60–180)
T4 FREE SERPL-MCNC: 1.05 NG/DL (ref 0.71–1.51)
TSH SERPL-ACNC: 4.84 UIU/ML (ref 0.4–4)

## 2025-08-01 PROCEDURE — 86787 VARICELLA-ZOSTER ANTIBODY: CPT

## 2025-08-01 PROCEDURE — 36415 COLL VENOUS BLD VENIPUNCTURE: CPT | Mod: PO

## 2025-08-01 PROCEDURE — 84443 ASSAY THYROID STIM HORMONE: CPT

## 2025-08-01 PROCEDURE — 84480 ASSAY TRIIODOTHYRONINE (T3): CPT

## 2025-08-01 PROCEDURE — 86762 RUBELLA ANTIBODY: CPT

## 2025-08-01 PROCEDURE — 86706 HEP B SURFACE ANTIBODY: CPT

## 2025-08-01 PROCEDURE — 86735 MUMPS ANTIBODY: CPT

## 2025-08-01 PROCEDURE — 86765 RUBEOLA ANTIBODY: CPT

## 2025-08-01 PROCEDURE — 84439 ASSAY OF FREE THYROXINE: CPT

## 2025-08-04 LAB
MUMPS IGG (OHS): 241 AU/ML
MUMPS IGG INTERPRETATION (OHS): POSITIVE
RUBEOLA (MEASLES) IGG (OHS): 237 AU/ML
RUBEOLA IGG INTERP. (OHS): POSITIVE
RUBV IGG SER-ACNC: 234 IU/ML
RUBV IGG SER-IMP: REACTIVE
V.ZOSTER IGG INTERP (OHS): POSITIVE
VARICELLA ZOSTER IGG (OHS): 15.5 S/CO
W HEPATITIS B SURFACE ANTIBODY, QUALITATIVE: POSITIVE
W HEPATITIS B SURFACE ANTIBODY, QUANTITATIVE: NORMAL MIU/ML

## 2025-08-08 ENCOUNTER — OFFICE VISIT (OUTPATIENT)
Dept: ENDOCRINOLOGY | Facility: CLINIC | Age: 40
End: 2025-08-08
Payer: OTHER GOVERNMENT

## 2025-08-08 DIAGNOSIS — E03.9 ACQUIRED HYPOTHYROIDISM: Primary | ICD-10-CM

## 2025-08-08 DIAGNOSIS — E05.00 GRAVES DISEASE: ICD-10-CM

## 2025-08-08 RX ORDER — LEVOTHYROXINE SODIUM 137 UG/1
137 TABLET ORAL
Qty: 90 TABLET | Refills: 3 | Status: SHIPPED | OUTPATIENT
Start: 2025-08-08

## 2025-08-08 NOTE — Clinical Note
Repeat lab work in 2 months:  Only doing TSH/T4, [facility] on gauss, repeat lab 6 mo along with Virtual visit: TSH/T4/T3

## 2025-08-08 NOTE — PROGRESS NOTES
The patient location is: [place]  The chief complaint leading to consultation is:  Hypothyroidism    Visit type: audiovisual    Face to Face time with patient: 10  20 minutes of total time spent on the encounter, which includes face to face time and non-face to face time preparing to see the patient (eg, review of tests), Obtaining and/or reviewing separately obtained history, Documenting clinical information in the electronic or other health record, Independently interpreting results (not separately reported) and communicating results to the patient/family/caregiver, or Care coordination (not separately reported).     Each patient to whom he or she provides medical services by telemedicine is:  (1) informed of the relationship between the physician and patient and the respective role of any other health care provider with respect to management of the patient; and (2) notified that he or she may decline to receive medical services by telemedicine and may withdraw from such care at any time.    Subjective:      Patient ID: Halley Mariee is a 39 y.o. female presented to [facility] on 8/8/2025.  Chief complaint:  No chief complaint on file.      History of Present illness: Halley Mariee is a 39 y.o. female here for  Postablative hypothyroidism  No other significant past medical history  Current PCP Keisha Shaw FNP-C    INTERVAL HISTORY: Here for follow-up hypothyroidism: Reports poor compliance with current Synthroid  Patient is aware that she needs to take 1 pill daily, but admits to missing doses regularly due to busy schedule  Increase in fatigue, TSH mildly increased at 4.4 with low total T3  Does have lactose intolerance  Reports her menstrual cycle has returned now  She ius forting 1x a week  Had issue with getting medicaition, ran out for 3 days      Hypothyroidism  - Diagnosed  in: age 11, GRAVES treated at that time with Postablative radioactive iodide  "ablation  - Patient is is in the , in the past was managed by endocrinology.  Now managed by PCP   - Currently on Synthroid Dose was decreased over the last few months due to suppressed TSH in symptoms of hyperthyroidism   - Family history thyroid disorder: yes, dad with hypothyroidism and twin sister with Hashimoto  - Family history of thyroid cancer: no  - Hx of Thyroid goiter: no  - Tobacco user: no  - No plans for future pregnancy, 4 child  - Recommend avoid the use of [place] thyroid, discussed with patient, given history of lactose intolerance in absorption  - Synthroid dose was increased to 150 mcg 7 days a week 3/2025    Currently on Synthroid 150 mcg 6 days a week, skip SUNDAY. Avg 128 mcg/d  - Takes thyroid medication properly without food first thing in the morning, yes    Current symptoms:   No   Yes  [x]    []   Weight gain  []    [x]   Fatigue>> improved  [x]    []   Constipation  [x]    []   Hair loss  [x]    []   Brittle nails  [x]    []   Mental fog  [x]    []   Cold intolerance  [x]    []   Recent severe illness  [x]    []   Neck swelling, pain, pressure  [x]    []   Hoarseness    Medications:  [x]    []   Lithium Use  [x]    []   Amiodarone use  [x]    []   NSAIDs/systemic steroid  [x]    []   Chemotherapy   [x]    []   Radiation Treatment  []    []   Estrogen therapy  []    []   Supplements:  That includes:  High dose iodine: Including "thyroid support", Kelp, Iodine drops, [place], Bladder wrack  []    []   Other Supplements: Biotin, Ashwagandha, selenium    Reproductive Issue:  []    []   Galactorrhea  [x]    []   Infertility/abnormal menstruation  [x]    []   Recent pregnancy  [x]    []   Decreased libido, erectile dysfunction    Thyroid lab work  Lab Results   Component Value Date    TSH 4.842 (H) 08/01/2025    TSH 4.409 (H) 02/27/2025    TSH 2.690 01/24/2025    T4FREE 1.15 01/24/2025    T4FREE 1.66 08/23/2024    T4FREE 1.39 05/06/2024    FREET4 1.05 08/01/2025    FREET4 1.06 " "02/27/2025    FREET4 1.22 01/11/2024    F7ZUJHZ 59 (L) 08/01/2025    D1SHCLB 54 (L) 02/27/2025      Antibodies  Lab Results   Component Value Date    THYROPEROXID <6.0 02/27/2025    THYROIDSTIMI <0.10 02/27/2025     The patient's medications, allergies, past medical, surgical, social and family histories were reviewed and updated as appropriate.  Review of Systems: pertinent positives as per the above HPI, and otherwise negative.    Objective:   LMP 07/08/2025 (Approximate)   There is no height or weight on file to calculate BMI.  Vital signs reviewed    Physical Exam  Constitutional:       Comments: Due to the virtual nature of this visit, a physical exam and vital signs were not obtained.       Labs reviewed:  See results in subjective  No results found for: "HGBA1C"  Lab Results   Component Value Date    CHOL 165 05/06/2024    HDL 53 05/06/2024    LDLCALC 101 (H) 05/06/2024    TRIG 57 05/06/2024     Lab Results   Component Value Date     02/27/2025    K 3.5 02/27/2025     02/27/2025    CO2 23 02/27/2025    GLU 85 02/27/2025    BUN 14 02/27/2025    CREATININE 0.7 02/27/2025    CALCIUM 9.1 02/27/2025    PROT 7.3 02/27/2025    ALBUMIN 4.4 02/27/2025    BILITOT 0.4 02/27/2025    ALKPHOS 74 02/27/2025    AST 38 02/27/2025    ALT 16 02/27/2025    ANIONGAP 10 02/27/2025    EGFRNORACEVR >60.0 02/27/2025    TSH 4.842 (H) 08/01/2025    [id number] 40 02/27/2025     Assessment     1. Acquired hypothyroidism  SYNTHROID 137 mcg Tab tablet    TSH    T4, Free    T3    TSH    T4, Free    T3      2. Graves disease          Plan     Acquired hypothyroidism  - Hypothyroidism due to autoimmune thyroiditis:  Reportedly Graves status post BATISTA at age 11  - I reviewed lab work trends: TSH, Free T4, with patient in clinic today 8/8/2025   - Discussed and confirmed the proper way of taking Synthroid: daily on an empty stomach, waiting 30 minutes before any other medication. The patient verbalized understanding.  - patient " admits to poor compliance, not taking medication regularly at this office visit 8/8/2025  - TSH elevated with low T3 on recent bloodwork    PLAN  - Long discussion with patient about compliance, advise to not skip doses as this can lead to irregular thyroid level  - CONTINUE NAME BRAND: Synthroid 150 mcg daily, 7 days a week.    - Trend lab work TSH, FT4, and check TPO in 2 and in 6 months> follow-up with endocrinology to adjust medication  - Once TSH/T4 in normal range, patient should continue refills with their PCP Keisha Shaw FNP-C, given chronic nature of hypothyroidism    - no plans for future pregnancy  - follow up with endocrine virtually every 6 months    Graves disease  - Reports history of BATISTA at age 11 for Graves  - Negative thyroid antibody  - Recommended a gluten-free or gluten-avoidance diet due to its potential association with autoimmune conditions of the small intestine.  - Recommended over-the-counter Vitamin D3 supplements for immune support.      Advised patient to follow up with PCP for routine health maintenance care.   RTC in 6 months, virtual visit    Mark Kee M.D.  Endocrinology  [facility]  8/8/2025      Disclaimer: This note has been generated in part with the use of voice-recognition software. There may be typographical errors that have been missed during proof-reading.

## 2025-08-14 ENCOUNTER — OCCUPATIONAL HEALTH (OUTPATIENT)
Dept: URGENT CARE | Facility: CLINIC | Age: 40
End: 2025-08-14

## 2025-08-14 DIAGNOSIS — Z00.00 ROUTINE GENERAL MEDICAL EXAMINATION AT A HEALTH CARE FACILITY: Primary | ICD-10-CM

## 2025-08-22 ENCOUNTER — PATIENT MESSAGE (OUTPATIENT)
Dept: ENDOCRINOLOGY | Facility: CLINIC | Age: 40
End: 2025-08-22
Payer: OTHER GOVERNMENT